# Patient Record
Sex: MALE | Race: WHITE | NOT HISPANIC OR LATINO | Employment: PART TIME | URBAN - NONMETROPOLITAN AREA
[De-identification: names, ages, dates, MRNs, and addresses within clinical notes are randomized per-mention and may not be internally consistent; named-entity substitution may affect disease eponyms.]

---

## 2022-06-30 ENCOUNTER — APPOINTMENT (OUTPATIENT)
Dept: FAMILY MEDICINE | Facility: OTHER | Age: 42
End: 2022-06-30
Attending: NURSE PRACTITIONER

## 2022-06-30 PROCEDURE — 99499 UNLISTED E&M SERVICE: CPT | Performed by: NURSE PRACTITIONER

## 2023-08-17 ENCOUNTER — OFFICE VISIT (OUTPATIENT)
Dept: FAMILY MEDICINE | Facility: OTHER | Age: 43
End: 2023-08-17
Attending: FAMILY MEDICINE
Payer: MEDICAID

## 2023-08-17 VITALS
HEIGHT: 66 IN | SYSTOLIC BLOOD PRESSURE: 120 MMHG | OXYGEN SATURATION: 94 % | RESPIRATION RATE: 20 BRPM | DIASTOLIC BLOOD PRESSURE: 70 MMHG | WEIGHT: 146.4 LBS | BODY MASS INDEX: 23.53 KG/M2 | HEART RATE: 69 BPM | TEMPERATURE: 98.2 F

## 2023-08-17 DIAGNOSIS — F43.10 PTSD (POST-TRAUMATIC STRESS DISORDER): ICD-10-CM

## 2023-08-17 DIAGNOSIS — G80.8 OTHER CEREBRAL PALSY (H): ICD-10-CM

## 2023-08-17 DIAGNOSIS — F15.11 METHAMPHETAMINE ABUSE IN REMISSION (H): Primary | ICD-10-CM

## 2023-08-17 DIAGNOSIS — S68.119S: ICD-10-CM

## 2023-08-17 DIAGNOSIS — Z02.89 HEALTH EXAMINATION OF DEFINED SUBPOPULATION: ICD-10-CM

## 2023-08-17 DIAGNOSIS — Z79.899 MEDICAL CANNABIS USE: ICD-10-CM

## 2023-08-17 PROBLEM — S68.119A FINGER AMPUTATION, NO COMPLICATION: Status: ACTIVE | Noted: 2023-08-17

## 2023-08-17 LAB
ALBUMIN SERPL BCG-MCNC: 4.7 G/DL (ref 3.5–5.2)
ALP SERPL-CCNC: 90 U/L (ref 40–129)
ALT SERPL W P-5'-P-CCNC: 15 U/L (ref 0–70)
ANION GAP SERPL CALCULATED.3IONS-SCNC: 6 MMOL/L (ref 7–15)
AST SERPL W P-5'-P-CCNC: 20 U/L (ref 0–45)
BASOPHILS # BLD AUTO: 0.1 10E3/UL (ref 0–0.2)
BASOPHILS NFR BLD AUTO: 1 %
BILIRUB SERPL-MCNC: 0.5 MG/DL
BUN SERPL-MCNC: 14.9 MG/DL (ref 6–20)
CALCIUM SERPL-MCNC: 9.6 MG/DL (ref 8.6–10)
CHLORIDE SERPL-SCNC: 103 MMOL/L (ref 98–107)
CHOLEST SERPL-MCNC: 162 MG/DL
CREAT SERPL-MCNC: 0.9 MG/DL (ref 0.67–1.17)
DEPRECATED HCO3 PLAS-SCNC: 30 MMOL/L (ref 22–29)
EOSINOPHIL # BLD AUTO: 0.1 10E3/UL (ref 0–0.7)
EOSINOPHIL NFR BLD AUTO: 1 %
ERYTHROCYTE [DISTWIDTH] IN BLOOD BY AUTOMATED COUNT: 12.2 % (ref 10–15)
GFR SERPL CREATININE-BSD FRML MDRD: >90 ML/MIN/1.73M2
GLUCOSE SERPL-MCNC: 97 MG/DL (ref 70–99)
HCT VFR BLD AUTO: 44.2 % (ref 40–53)
HDLC SERPL-MCNC: 37 MG/DL
HGB BLD-MCNC: 15.6 G/DL (ref 13.3–17.7)
IMM GRANULOCYTES # BLD: 0 10E3/UL
IMM GRANULOCYTES NFR BLD: 0 %
LDLC SERPL CALC-MCNC: 85 MG/DL
LYMPHOCYTES # BLD AUTO: 2.5 10E3/UL (ref 0.8–5.3)
LYMPHOCYTES NFR BLD AUTO: 29 %
MCH RBC QN AUTO: 30.7 PG (ref 26.5–33)
MCHC RBC AUTO-ENTMCNC: 35.3 G/DL (ref 31.5–36.5)
MCV RBC AUTO: 87 FL (ref 78–100)
MONOCYTES # BLD AUTO: 0.8 10E3/UL (ref 0–1.3)
MONOCYTES NFR BLD AUTO: 9 %
NEUTROPHILS # BLD AUTO: 5.1 10E3/UL (ref 1.6–8.3)
NEUTROPHILS NFR BLD AUTO: 60 %
NONHDLC SERPL-MCNC: 125 MG/DL
NRBC # BLD AUTO: 0 10E3/UL
NRBC BLD AUTO-RTO: 0 /100
PLATELET # BLD AUTO: 208 10E3/UL (ref 150–450)
POTASSIUM SERPL-SCNC: 4.1 MMOL/L (ref 3.4–5.3)
PROT SERPL-MCNC: 7.2 G/DL (ref 6.4–8.3)
RBC # BLD AUTO: 5.08 10E6/UL (ref 4.4–5.9)
SODIUM SERPL-SCNC: 139 MMOL/L (ref 136–145)
TRIGL SERPL-MCNC: 198 MG/DL
WBC # BLD AUTO: 8.5 10E3/UL (ref 4–11)

## 2023-08-17 PROCEDURE — 80061 LIPID PANEL: CPT | Mod: ZL | Performed by: FAMILY MEDICINE

## 2023-08-17 PROCEDURE — 36415 COLL VENOUS BLD VENIPUNCTURE: CPT | Mod: ZL | Performed by: FAMILY MEDICINE

## 2023-08-17 PROCEDURE — 99386 PREV VISIT NEW AGE 40-64: CPT | Performed by: FAMILY MEDICINE

## 2023-08-17 PROCEDURE — 85025 COMPLETE CBC W/AUTO DIFF WBC: CPT | Mod: ZL | Performed by: FAMILY MEDICINE

## 2023-08-17 PROCEDURE — 80053 COMPREHEN METABOLIC PANEL: CPT | Mod: ZL | Performed by: FAMILY MEDICINE

## 2023-08-17 ASSESSMENT — ENCOUNTER SYMPTOMS
DIARRHEA: 0
ARTHRALGIAS: 1
SORE THROAT: 0
EYE PAIN: 0
SHORTNESS OF BREATH: 0
WEAKNESS: 1
NERVOUS/ANXIOUS: 1
HEARTBURN: 0
COUGH: 0
CHILLS: 0
HEMATOCHEZIA: 0
PALPITATIONS: 1
JOINT SWELLING: 0
PARESTHESIAS: 0
DIZZINESS: 0
ABDOMINAL PAIN: 0
NAUSEA: 1
HEMATURIA: 0
FEVER: 0
FREQUENCY: 0
CONSTIPATION: 0
HEADACHES: 1
DYSURIA: 0

## 2023-08-17 ASSESSMENT — PAIN SCALES - GENERAL: PAINLEVEL: MILD PAIN (3)

## 2023-08-17 NOTE — LETTER
August 21, 2023      Omar Hay  [address]  [place] [address]        Dear ,    We are writing to inform you of your test results.    Your test results fall within the expected range(s) or remain unchanged from previous results.  Please continue with current treatment plan.    Resulted Orders   Lipid Panel   Result Value Ref Range    Cholesterol 162 <200 mg/dL    Triglycerides 198 (H) <150 mg/dL    Direct Measure HDL 37 (L) >=40 mg/dL    LDL Cholesterol Calculated 85 <=100 mg/dL    Non HDL Cholesterol 125 <130 mg/dL    Narrative    Cholesterol  Desirable:  <200 mg/dL    Triglycerides  Normal:  Less than 150 mg/dL  Borderline High:  150-199 mg/dL  High:  200-499 mg/dL  Very High:  Greater than or equal to 500 mg/dL    Direct Measure HDL  Female:  Greater than or equal to 50 mg/dL   Male:  Greater than or equal to 40 mg/dL    LDL Cholesterol  Desirable:  <100mg/dL  Above Desirable:  100-129 mg/dL   Borderline High:  130-159 mg/dL   High:  160-189 mg/dL   Very High:  >= 190 mg/dL    Non HDL Cholesterol  Desirable:  130 mg/dL  Above Desirable:  130-159 mg/dL  Borderline High:  160-189 mg/dL  High:  190-219 mg/dL  Very High:  Greater than or equal to 220 mg/dL   Comprehensive Metabolic Panel   Result Value Ref Range    Sodium 139 136 - 145 mmol/L    Potassium 4.1 3.4 - 5.3 mmol/L    Chloride 103 98 - 107 mmol/L    Carbon Dioxide (CO2) 30 (H) 22 - 29 mmol/L    Anion Gap 6 (L) 7 - 15 mmol/L    Urea Nitrogen 14.9 6.0 - 20.0 mg/dL    Creatinine 0.90 0.67 - 1.17 mg/dL    Calcium 9.6 8.6 - 10.0 mg/dL    Glucose 97 70 - 99 mg/dL    Alkaline Phosphatase 90 40 - 129 U/L    AST 20 0 - 45 U/L      Comment:      Reference intervals for this test were updated on 6/12/2023 to more accurately reflect our healthy population. There may be differences in the flagging of prior results with similar values performed with this method. Interpretation of those prior results can be made in the context of the updated reference  intervals.    ALT 15 0 - 70 U/L      Comment:      Reference intervals for this test were updated on 6/12/2023 to more accurately reflect our healthy population. There may be differences in the flagging of prior results with similar values performed with this method. Interpretation of those prior results can be made in the context of the updated reference intervals.      Protein Total 7.2 6.4 - 8.3 g/dL    Albumin 4.7 3.5 - 5.2 g/dL    Bilirubin Total 0.5 <=1.2 mg/dL    GFR Estimate >90 >60 mL/min/1.73m2   CBC with platelets and differential   Result Value Ref Range    WBC Count 8.5 4.0 - 11.0 10e3/uL    RBC Count 5.08 4.40 - 5.90 10e6/uL    Hemoglobin 15.6 13.3 - 17.7 g/dL    Hematocrit 44.2 40.0 - 53.0 %    MCV 87 78 - 100 fL    MCH 30.7 26.5 - 33.0 pg    MCHC 35.3 31.5 - 36.5 g/dL    RDW 12.2 10.0 - 15.0 %    Platelet Count 208 150 - 450 10e3/uL    % Neutrophils 60 %    % Lymphocytes 29 %    % Monocytes 9 %    % Eosinophils 1 %    % Basophils 1 %    % Immature Granulocytes 0 %    NRBCs per 100 WBC 0 <1 /100    Absolute Neutrophils 5.1 1.6 - 8.3 10e3/uL    Absolute Lymphocytes 2.5 0.8 - 5.3 10e3/uL    Absolute Monocytes 0.8 0.0 - 1.3 10e3/uL    Absolute Eosinophils 0.1 0.0 - 0.7 10e3/uL    Absolute Basophils 0.1 0.0 - 0.2 10e3/uL    Absolute Immature Granulocytes 0.0 <=0.4 10e3/uL    Absolute NRBCs 0.0 10e3/uL       If you have any questions or concerns, please call the clinic at the number listed above.       Sincerely,      Carlos Ozuna MD

## 2023-08-17 NOTE — NURSING NOTE
Patient here for physical and to establish care, last eye exam longer than 5 years and last dental exam 2022. Medication Reconciliation: complete.    Vivian Last LPN  8/17/2023 10:32 AM

## 2023-08-21 ASSESSMENT — ENCOUNTER SYMPTOMS
HEMATOCHEZIA: 0
EYE PAIN: 0
DIARRHEA: 0
SHORTNESS OF BREATH: 0
DIZZINESS: 0
FREQUENCY: 0
DYSURIA: 0
NAUSEA: 1
PARESTHESIAS: 0
HEARTBURN: 0
SORE THROAT: 0
CONSTIPATION: 0
COUGH: 0
JOINT SWELLING: 0
ABDOMINAL PAIN: 0
HEMATURIA: 0
PALPITATIONS: 1
HEADACHES: 1
ARTHRALGIAS: 1
NERVOUS/ANXIOUS: 1
CHILLS: 0
FEVER: 0
WEAKNESS: 1

## 2023-08-21 NOTE — PROGRESS NOTES
SUBJECTIVE:   Omar Hay is a 42 year old male who presents to clinic today for the following health issues: Physical    Patient is from [place] and arrives here for physical.  Establish care.  Patient does have a history of meth abuse.  Is been sober for 20 months.  He is currently going to [place].    Healthy Habits:     Getting at least 3 servings of Calcium per day:  NO    Bi-annual eye exam:  NO    Dental care twice a year:  NO    Sleep apnea or symptoms of sleep apnea:  Daytime drowsiness    Diet:  Regular (no restrictions)    Frequency of exercise:  2-3 days/week    Duration of exercise:  Less than 15 minutes    Taking medications regularly:  Yes    Medication side effects:  Not applicable    Additional concerns today:  Yes        Patient Active Problem List    Diagnosis Date Noted    Methamphetamine abuse in remission (H) 08/17/2023     Priority: Medium    Other cerebral palsy (H) 08/17/2023     Priority: Medium    Finger amputation, no complication 08/17/2023     Priority: Medium    PTSD (post-traumatic stress disorder) 08/17/2023     Priority: Medium    Medical cannabis use 08/17/2023     Priority: Medium       Review of Systems   Constitutional:  Negative for chills and fever.   HENT:  Negative for congestion, ear pain, hearing loss and sore throat.    Eyes:  Negative for pain and visual disturbance.   Respiratory:  Negative for cough and shortness of breath.    Cardiovascular:  Positive for palpitations. Negative for chest pain and peripheral edema.   Gastrointestinal:  Positive for nausea. Negative for abdominal pain, constipation, diarrhea, heartburn and hematochezia.   Genitourinary:  Negative for dysuria, frequency, genital sores, hematuria, impotence, penile discharge and urgency.   Musculoskeletal:  Positive for arthralgias. Negative for joint swelling.   Skin:  Negative for rash.   Neurological:  Positive for weakness and headaches. Negative for dizziness and paresthesias.  "  Psychiatric/Behavioral:  Positive for mood changes. The patient is nervous/anxious.         OBJECTIVE:     /70   Pulse 69   Temp 98.2  F (36.8  C)   Resp 20   Ht 1.664 m (5' 5.5\")   Wt 66.4 kg (146 lb 6.4 oz)   SpO2 94%   BMI 23.99 kg/m    Body mass index is 23.99 kg/m .  Physical Exam  Constitutional:       Appearance: Normal appearance.   HENT:      Head: Normocephalic.      Mouth/Throat:      Mouth: Mucous membranes are moist.   Cardiovascular:      Rate and Rhythm: Normal rate and regular rhythm.   Pulmonary:      Effort: Pulmonary effort is normal.      Breath sounds: Normal breath sounds.   Neurological:      Mental Status: He is alert.   Psychiatric:         Mood and Affect: Mood normal.         Thought Content: Thought content normal.         Diagnostic Test Results:  No results found for this or any previous visit (from the past 24 hour(s)).  Results for orders placed or performed in visit on 08/17/23   Lipid Panel     Status: Abnormal   Result Value Ref Range    Cholesterol 162 <200 mg/dL    Triglycerides 198 (H) <150 mg/dL    Direct Measure HDL 37 (L) >=40 mg/dL    LDL Cholesterol Calculated 85 <=100 mg/dL    Non HDL Cholesterol 125 <130 mg/dL    Narrative    Cholesterol  Desirable:  <200 mg/dL    Triglycerides  Normal:  Less than 150 mg/dL  Borderline High:  150-199 mg/dL  High:  200-499 mg/dL  Very High:  Greater than or equal to 500 mg/dL    Direct Measure HDL  Female:  Greater than or equal to 50 mg/dL   Male:  Greater than or equal to 40 mg/dL    LDL Cholesterol  Desirable:  <100mg/dL  Above Desirable:  100-129 mg/dL   Borderline High:  130-159 mg/dL   High:  160-189 mg/dL   Very High:  >= 190 mg/dL    Non HDL Cholesterol  Desirable:  130 mg/dL  Above Desirable:  130-159 mg/dL  Borderline High:  160-189 mg/dL  High:  190-219 mg/dL  Very High:  Greater than or equal to 220 mg/dL   Comprehensive Metabolic Panel     Status: Abnormal   Result Value Ref Range    Sodium 139 136 - 145 mmol/L "    Potassium 4.1 3.4 - 5.3 mmol/L    Chloride 103 98 - 107 mmol/L    Carbon Dioxide (CO2) 30 (H) 22 - 29 mmol/L    Anion Gap 6 (L) 7 - 15 mmol/L    Urea Nitrogen 14.9 6.0 - 20.0 mg/dL    Creatinine 0.90 0.67 - 1.17 mg/dL    Calcium 9.6 8.6 - 10.0 mg/dL    Glucose 97 70 - 99 mg/dL    Alkaline Phosphatase 90 40 - 129 U/L    AST 20 0 - 45 U/L    ALT 15 0 - 70 U/L    Protein Total 7.2 6.4 - 8.3 g/dL    Albumin 4.7 3.5 - 5.2 g/dL    Bilirubin Total 0.5 <=1.2 mg/dL    GFR Estimate >90 >60 mL/min/1.73m2   CBC with platelets and differential     Status: None   Result Value Ref Range    WBC Count 8.5 4.0 - 11.0 10e3/uL    RBC Count 5.08 4.40 - 5.90 10e6/uL    Hemoglobin 15.6 13.3 - 17.7 g/dL    Hematocrit 44.2 40.0 - 53.0 %    MCV 87 78 - 100 fL    MCH 30.7 26.5 - 33.0 pg    MCHC 35.3 31.5 - 36.5 g/dL    RDW 12.2 10.0 - 15.0 %    Platelet Count 208 150 - 450 10e3/uL    % Neutrophils 60 %    % Lymphocytes 29 %    % Monocytes 9 %    % Eosinophils 1 %    % Basophils 1 %    % Immature Granulocytes 0 %    NRBCs per 100 WBC 0 <1 /100    Absolute Neutrophils 5.1 1.6 - 8.3 10e3/uL    Absolute Lymphocytes 2.5 0.8 - 5.3 10e3/uL    Absolute Monocytes 0.8 0.0 - 1.3 10e3/uL    Absolute Eosinophils 0.1 0.0 - 0.7 10e3/uL    Absolute Basophils 0.1 0.0 - 0.2 10e3/uL    Absolute Immature Granulocytes 0.0 <=0.4 10e3/uL    Absolute NRBCs 0.0 10e3/uL   CBC and Differential     Status: None    Narrative    The following orders were created for panel order CBC and Differential.  Procedure                               Abnormality         Status                     ---------                               -----------         ------                     CBC with platelets and d...[[id number]]                      Final result                 Please view results for these tests on the individual orders.       ASSESSMENT/PLAN:         (F15.11) Methamphetamine abuse in remission (H)  (primary encounter diagnosis) patient denies IV drug use or sharing  needles.  Declines hepatitis C HIV  Comment:   Plan: Labs are satisfactory    (Z02.89) Health examination of defined subpopulation  Comment:   Plan: Lipid Panel, Comprehensive Metabolic Panel, CBC        and Differential            (G80.8) Other cerebral palsy (H)  Comment:   Plan:     (S68.119S) Amputation of finger without complication, sequela (H)  Comment:   Plan:     (F43.10) PTSD (post-traumatic stress disorder)  Comment:   Plan:     (Z79.899) Medical cannabis use  Comment:   Plan:       Carlos Ozuna MD  [facility] AND [facility]

## 2023-10-03 ENCOUNTER — OFFICE VISIT (OUTPATIENT)
Dept: FAMILY MEDICINE | Facility: OTHER | Age: 43
End: 2023-10-03
Attending: PHYSICIAN ASSISTANT
Payer: MEDICARE

## 2023-10-03 VITALS
TEMPERATURE: 97.8 F | DIASTOLIC BLOOD PRESSURE: 72 MMHG | WEIGHT: 147.6 LBS | OXYGEN SATURATION: 96 % | HEART RATE: 60 BPM | BODY MASS INDEX: 23.72 KG/M2 | SYSTOLIC BLOOD PRESSURE: 128 MMHG | RESPIRATION RATE: 14 BRPM | HEIGHT: 66 IN

## 2023-10-03 DIAGNOSIS — K64.4 EXTERNAL HEMORRHOIDS: Primary | ICD-10-CM

## 2023-10-03 PROCEDURE — 99213 OFFICE O/P EST LOW 20 MIN: CPT | Performed by: PHYSICIAN ASSISTANT

## 2023-10-03 PROCEDURE — G0463 HOSPITAL OUTPT CLINIC VISIT: HCPCS

## 2023-10-03 RX ORDER — DIBUCAINE 1 G/100G
OINTMENT TOPICAL 3 TIMES DAILY PRN
Qty: 60 G | Refills: 1 | Status: SHIPPED | OUTPATIENT
Start: 2023-10-03

## 2023-10-03 ASSESSMENT — PATIENT HEALTH QUESTIONNAIRE - PHQ9
10. IF YOU CHECKED OFF ANY PROBLEMS, HOW DIFFICULT HAVE THESE PROBLEMS MADE IT FOR YOU TO DO YOUR WORK, TAKE CARE OF THINGS AT HOME, OR GET ALONG WITH OTHER PEOPLE: SOMEWHAT DIFFICULT
SUM OF ALL RESPONSES TO PHQ QUESTIONS 1-9: 13
SUM OF ALL RESPONSES TO PHQ QUESTIONS 1-9: 13

## 2023-10-03 ASSESSMENT — PAIN SCALES - GENERAL: PAINLEVEL: EXTREME PAIN (8)

## 2023-10-03 NOTE — LETTER
October 3, 2023      Omar Galdamez Hay  [address]  [facility] [address]        To Whom It May Concern:    Omar Hay  was seen on 10/03/2023.  Please excuse his absence from work today.         Sincerely,        Janelle López PA-C

## 2023-10-03 NOTE — PROGRESS NOTES
Assessment & Plan     1. External hemorrhoids  Symptoms and exam consistent with non thrombosed external hemorrhoid. Prescription for topical numbing medication as below. Encourage good hydration, adequate fiber intake, prn management of constipation/harder stools. Follow up as needed.   - dibucaine 1 % OINT rectal ointment; Place rectally 3 times daily as needed for hemorrhoids  Dispense: 60 g; Refill: 1    No follow-ups on file.    Janelle López PA-C  [facility] AND [facility]    Ary Nelson is a 43 year old, presenting for the following health issues:  Rectal Problem      History of Present Illness       Reason for visit:  Anus pain  Symptom onset:  1-3 days ago    He eats 0-1 servings of fruits and vegetables daily.He consumes 6 sweetened beverage(s) daily.He exercises with enough effort to increase his heart rate 60 or more minutes per day.  He exercises with enough effort to increase his heart rate 6 days per week.   He is taking medications regularly.     Here for evaluation of rectal pain that began yesterday. Several episodes of loose stools after a fairly large more difficult stool yesterday morning. Having significant pain yesterday throughout today. Has not taken anything for symptomatic management. No abdominal pain, nausea, vomiting, blood in stool.     PAST MEDICAL HISTORY: No past medical history on file.    PAST SURGICAL HISTORY: No past surgical history on file.    FAMILY HISTORY: No family history on file.    SOCIAL HISTORY:   Social History     Tobacco Use    Smoking status: Former     Types: Cigarettes    Smokeless tobacco: Former   Substance Use Topics    Alcohol use: Not Currently        Allergies   Allergen Reactions    Penicillins Hives and Rash    Ketorolac Hives    Tramadol Hives and Nausea and Vomiting     rash     Current Outpatient Medications   Medication    dibucaine 1 % OINT rectal ointment    medical cannabis (Patient's own supply)     No current  "facility-administered medications for this visit.         Review of Systems   Per HPI        Objective    /72   Pulse 60   Temp 97.8  F (36.6  C)   Resp 14   Ht 1.664 m (5' 5.5\")   Wt 67 kg (147 lb 9.6 oz)   SpO2 96%   BMI 24.19 kg/m    Body mass index is 24.19 kg/m .  Physical Exam   General: Pleasant, in no apparent distress.  Rectal: Non thrombosed hemorrhoid in posterior region without bleeding. Patient deferred FRANCES.   Psych: Appropriate mood and affect.      "

## 2023-10-03 NOTE — NURSING NOTE
Patient presents to clinic with concerns about possible hemorrhoids. He states diarrhea started yesterday and it is painful to sit, walk  Nova Denny LPN ....................  10/3/2023   9:10 AM  EXT [id number]

## 2023-11-12 ENCOUNTER — APPOINTMENT (OUTPATIENT)
Dept: GENERAL RADIOLOGY | Facility: OTHER | Age: 43
End: 2023-11-12
Attending: PHYSICIAN ASSISTANT
Payer: MEDICARE

## 2023-11-12 ENCOUNTER — HOSPITAL ENCOUNTER (EMERGENCY)
Facility: OTHER | Age: 43
Discharge: HOME | End: 2023-11-12
Attending: PHYSICIAN ASSISTANT | Admitting: PHYSICIAN ASSISTANT
Payer: MEDICARE

## 2023-11-12 VITALS
WEIGHT: 151 LBS | OXYGEN SATURATION: 96 % | BODY MASS INDEX: 24.27 KG/M2 | DIASTOLIC BLOOD PRESSURE: 65 MMHG | SYSTOLIC BLOOD PRESSURE: 105 MMHG | RESPIRATION RATE: 16 BRPM | TEMPERATURE: 97.3 F | HEIGHT: 66 IN | HEART RATE: 48 BPM

## 2023-11-12 DIAGNOSIS — R19.7 NAUSEA VOMITING AND DIARRHEA: ICD-10-CM

## 2023-11-12 DIAGNOSIS — R11.2 NAUSEA VOMITING AND DIARRHEA: ICD-10-CM

## 2023-11-12 LAB
ALBUMIN SERPL BCG-MCNC: 4.4 G/DL (ref 3.5–5.2)
ALP SERPL-CCNC: 81 U/L (ref 40–129)
ALT SERPL W P-5'-P-CCNC: 12 U/L (ref 0–70)
ANION GAP SERPL CALCULATED.3IONS-SCNC: 10 MMOL/L (ref 7–15)
AST SERPL W P-5'-P-CCNC: 20 U/L (ref 0–45)
BASOPHILS # BLD AUTO: 0.1 10E3/UL (ref 0–0.2)
BASOPHILS NFR BLD AUTO: 1 %
BILIRUB SERPL-MCNC: 0.5 MG/DL
BUN SERPL-MCNC: 19.2 MG/DL (ref 6–20)
CALCIUM SERPL-MCNC: 9.1 MG/DL (ref 8.6–10)
CHLORIDE SERPL-SCNC: 104 MMOL/L (ref 98–107)
CREAT SERPL-MCNC: 0.85 MG/DL (ref 0.67–1.17)
DEPRECATED HCO3 PLAS-SCNC: 25 MMOL/L (ref 22–29)
EGFRCR SERPLBLD CKD-EPI 2021: >90 ML/MIN/1.73M2
EOSINOPHIL # BLD AUTO: 0.3 10E3/UL (ref 0–0.7)
EOSINOPHIL NFR BLD AUTO: 3 %
ERYTHROCYTE [DISTWIDTH] IN BLOOD BY AUTOMATED COUNT: 12.1 % (ref 10–15)
FLUAV RNA SPEC QL NAA+PROBE: NEGATIVE
FLUBV RNA RESP QL NAA+PROBE: NEGATIVE
GLUCOSE SERPL-MCNC: 101 MG/DL (ref 70–99)
HCT VFR BLD AUTO: 39.9 % (ref 40–53)
HGB BLD-MCNC: 14.1 G/DL (ref 13.3–17.7)
HOLD SPECIMEN: NORMAL
HOLD SPECIMEN: NORMAL
IMM GRANULOCYTES # BLD: 0 10E3/UL
IMM GRANULOCYTES NFR BLD: 0 %
LACTATE SERPL-SCNC: 1 MMOL/L (ref 0.7–2)
LIPASE SERPL-CCNC: 29 U/L (ref 13–60)
LYMPHOCYTES # BLD AUTO: 3.1 10E3/UL (ref 0.8–5.3)
LYMPHOCYTES NFR BLD AUTO: 33 %
MAGNESIUM SERPL-MCNC: 2.1 MG/DL (ref 1.7–2.3)
MCH RBC QN AUTO: 30.5 PG (ref 26.5–33)
MCHC RBC AUTO-ENTMCNC: 35.3 G/DL (ref 31.5–36.5)
MCV RBC AUTO: 86 FL (ref 78–100)
MONOCYTES # BLD AUTO: 1 10E3/UL (ref 0–1.3)
MONOCYTES NFR BLD AUTO: 11 %
NEUTROPHILS # BLD AUTO: 4.9 10E3/UL (ref 1.6–8.3)
NEUTROPHILS NFR BLD AUTO: 52 %
NRBC # BLD AUTO: 0 10E3/UL
NRBC BLD AUTO-RTO: 0 /100
PLATELET # BLD AUTO: 202 10E3/UL (ref 150–450)
POTASSIUM SERPL-SCNC: 3.9 MMOL/L (ref 3.4–5.3)
PROT SERPL-MCNC: 6.5 G/DL (ref 6.4–8.3)
RBC # BLD AUTO: 4.62 10E6/UL (ref 4.4–5.9)
RSV RNA SPEC NAA+PROBE: NEGATIVE
SARS-COV-2 RNA RESP QL NAA+PROBE: NEGATIVE
SODIUM SERPL-SCNC: 139 MMOL/L (ref 135–145)
TROPONIN T SERPL HS-MCNC: <6 NG/L
WBC # BLD AUTO: 9.3 10E3/UL (ref 4–11)

## 2023-11-12 PROCEDURE — 83690 ASSAY OF LIPASE: CPT | Performed by: PHYSICIAN ASSISTANT

## 2023-11-12 PROCEDURE — 83735 ASSAY OF MAGNESIUM: CPT | Performed by: PHYSICIAN ASSISTANT

## 2023-11-12 PROCEDURE — 96361 HYDRATE IV INFUSION ADD-ON: CPT | Performed by: PHYSICIAN ASSISTANT

## 2023-11-12 PROCEDURE — 96360 HYDRATION IV INFUSION INIT: CPT | Performed by: PHYSICIAN ASSISTANT

## 2023-11-12 PROCEDURE — 99284 EMERGENCY DEPT VISIT MOD MDM: CPT | Performed by: PHYSICIAN ASSISTANT

## 2023-11-12 PROCEDURE — 99285 EMERGENCY DEPT VISIT HI MDM: CPT | Mod: 25 | Performed by: PHYSICIAN ASSISTANT

## 2023-11-12 PROCEDURE — 93005 ELECTROCARDIOGRAM TRACING: CPT | Performed by: PHYSICIAN ASSISTANT

## 2023-11-12 PROCEDURE — 80053 COMPREHEN METABOLIC PANEL: CPT | Performed by: PHYSICIAN ASSISTANT

## 2023-11-12 PROCEDURE — 93005 ELECTROCARDIOGRAM TRACING: CPT | Mod: RTG

## 2023-11-12 PROCEDURE — C9803 HOPD COVID-19 SPEC COLLECT: HCPCS | Performed by: PHYSICIAN ASSISTANT

## 2023-11-12 PROCEDURE — 36415 COLL VENOUS BLD VENIPUNCTURE: CPT | Performed by: PHYSICIAN ASSISTANT

## 2023-11-12 PROCEDURE — 85025 COMPLETE CBC W/AUTO DIFF WBC: CPT | Performed by: PHYSICIAN ASSISTANT

## 2023-11-12 PROCEDURE — 83605 ASSAY OF LACTIC ACID: CPT | Performed by: PHYSICIAN ASSISTANT

## 2023-11-12 PROCEDURE — 258N000003 HC RX IP 258 OP 636: Performed by: PHYSICIAN ASSISTANT

## 2023-11-12 PROCEDURE — 93010 ELECTROCARDIOGRAM REPORT: CPT | Performed by: INTERNAL MEDICINE

## 2023-11-12 PROCEDURE — 71045 X-RAY EXAM CHEST 1 VIEW: CPT | Mod: TC

## 2023-11-12 PROCEDURE — 87637 SARSCOV2&INF A&B&RSV AMP PRB: CPT | Performed by: PHYSICIAN ASSISTANT

## 2023-11-12 PROCEDURE — 84484 ASSAY OF TROPONIN QUANT: CPT | Performed by: PHYSICIAN ASSISTANT

## 2023-11-12 RX ORDER — ONDANSETRON 4 MG/1
4 TABLET, ORALLY DISINTEGRATING ORAL EVERY 6 HOURS PRN
Qty: 10 TABLET | Refills: 0 | Status: SHIPPED | OUTPATIENT
Start: 2023-11-12 | End: 2024-03-19

## 2023-11-12 RX ORDER — ONDANSETRON 2 MG/ML
4 INJECTION INTRAMUSCULAR; INTRAVENOUS ONCE
Status: COMPLETED | OUTPATIENT
Start: 2023-11-12 | End: 2023-11-12

## 2023-11-12 RX ADMIN — SODIUM CHLORIDE 1000 ML: 9 INJECTION, SOLUTION INTRAVENOUS at 20:05

## 2023-11-12 ASSESSMENT — ACTIVITIES OF DAILY LIVING (ADL): ADLS_ACUITY_SCORE: 35

## 2023-11-13 LAB
ATRIAL RATE - MUSE: 46 BPM
DIASTOLIC BLOOD PRESSURE - MUSE: NORMAL MMHG
INTERPRETATION ECG - MUSE: NORMAL
P AXIS - MUSE: 71 DEGREES
PR INTERVAL - MUSE: 118 MS
QRS DURATION - MUSE: 94 MS
QT - MUSE: 454 MS
QTC - MUSE: 397 MS
R AXIS - MUSE: 72 DEGREES
SYSTOLIC BLOOD PRESSURE - MUSE: NORMAL MMHG
T AXIS - MUSE: 55 DEGREES
VENTRICULAR RATE- MUSE: 46 BPM

## 2023-11-13 NOTE — ED TRIAGE NOTES
Pt arrives to ER via private vehicle with c/o N/V/Diarrhea, and dizziness that started yesterday afternoon. Pt denies fevers or feeling SOB.

## 2023-11-13 NOTE — ED PROVIDER NOTES
"      EMERGENCY DEPARTMENT ENCOUNTER      NAME: Omar Hay  AGE: 43 year old male  YOB: 1980  MRN: [id number]  EVALUATION DATE & TIME: 11/12/2023  7:30 PM    PCP: Carlos Ozuna    ED PROVIDER: Lowell Pantoja PA-C       CHIEF COMPLAINT:  Chief Complaint   Patient presents with    Dizziness    Nausea, Vomiting, & Diarrhea       ED COURSE, MEDICAL DECISION MAKING, FINAL IMPRESSION AND PLAN:     Assessment / Plan:  1. Nausea vomiting and diarrhea        The patient was interviewed and examined.  HPI and physical exam as below.  Differential diagnosis and MDM Key Documentation Elements as below.  Vitals, triage note, and nursing notes were reviewed.  /65   Pulse (!) 48   Temp 97.3  F (36.3  C) (Tympanic)   Resp 16   Ht 1.676 m (5' 6\")   Wt 68.5 kg (151 lb)   SpO2 96%   BMI 24.37 kg/m      Differential includes but is not limited to gastroenteritis, colitis, diverticulitis, cardiac arrhythmia, UTI, appendicitis, food poisoning    Patient was afebrile with otherwise normal vitals.  Patient was in no acute distress.  Examination today was unremarkable with no acute findings.  No abdominal tenderness.  Heart was regular.  Lungs were clear.  EKG showed sinus bradycardia without ST segment deviation to suggest ACS/MI.  Screening troponin nonelevated.  RSV, influenza, and COVID were negative.  No leukocytosis.  No anemia.  Normal renal, hepatic, and pancreatic functions.  Normal lactic acid.  No electrolyte abnormalities.  Chest x-ray clear.  No abdominal tenderness to warrant CT imaging of the abdomen pelvis.  Patient was given IV fluids and IV Zofran with patient feeling significantly better.  Patient states he is ready to go home.  Discussed close follow-up precautions.  Follow-up with his primary care doctor.  Return to the ER for any worsening of symptoms    Medications given during today's ER visit:  Medications   sodium chloride 0.9% BOLUS 1,000 mL (0 mLs Intravenous Stopped " 11/12/23 2136)   ondansetron (ZOFRAN) injection 4 mg (4 mg Intravenous Not Given 11/12/23 2010)       New prescriptions started at today's ER visit  Discharge Medication List as of 11/12/2023  9:43 PM        START taking these medications    Details   ondansetron (ZOFRAN ODT) 4 MG ODT tab Take 1 tablet (4 mg) by mouth every 6 hours as needed for nausea, Disp-10 tablet, R-0, [facility]               Pertinent Labs & Imaging studies reviewed. (See chart for details)  Results for orders placed or performed during the hospital encounter of 11/12/23   XR Chest Port 1 View    Impression    IMPRESSION:   No acute findings.     THIS DOCUMENT HAS BEEN ELECTRONICALLY SIGNED BY JUDY VIZCAINO MD   Comprehensive metabolic panel   Result Value Ref Range    Sodium 139 135 - 145 mmol/L    Potassium 3.9 3.4 - 5.3 mmol/L    Carbon Dioxide (CO2) 25 22 - 29 mmol/L    Anion Gap 10 7 - 15 mmol/L    Urea Nitrogen 19.2 6.0 - 20.0 mg/dL    Creatinine 0.85 0.67 - 1.17 mg/dL    GFR Estimate >90 >60 mL/min/1.73m2    Calcium 9.1 8.6 - 10.0 mg/dL    Chloride 104 98 - 107 mmol/L    Glucose 101 (H) 70 - 99 mg/dL    Alkaline Phosphatase 81 40 - 129 U/L    AST 20 0 - 45 U/L    ALT 12 0 - 70 U/L    Protein Total 6.5 6.4 - 8.3 g/dL    Albumin 4.4 3.5 - 5.2 g/dL    Bilirubin Total 0.5 <=1.2 mg/dL   Result Value Ref Range    Lipase 29 13 - 60 U/L   Lactic acid whole blood   Result Value Ref Range    Lactic Acid 1.0 0.7 - 2.0 mmol/L   Result Value Ref Range    Magnesium 2.1 1.7 - 2.3 mg/dL   Result Value Ref Range    Troponin T, High Sensitivity <6 <=22 ng/L   CBC with platelets and differential   Result Value Ref Range    WBC Count 9.3 4.0 - 11.0 10e3/uL    RBC Count 4.62 4.40 - 5.90 10e6/uL    Hemoglobin 14.1 13.3 - 17.7 g/dL    Hematocrit 39.9 (L) 40.0 - 53.0 %    MCV 86 78 - 100 fL    MCH 30.5 26.5 - 33.0 pg    MCHC 35.3 31.5 - 36.5 g/dL    RDW 12.1 10.0 - 15.0 %    Platelet Count 202 150 - 450 10e3/uL    % Neutrophils 52 %    % Lymphocytes 33 %    %  "Monocytes 11 %    % Eosinophils 3 %    % Basophils 1 %    % Immature Granulocytes 0 %    NRBCs per 100 WBC 0 <1 /100    Absolute Neutrophils 4.9 1.6 - 8.3 10e3/uL    Absolute Lymphocytes 3.1 0.8 - 5.3 10e3/uL    Absolute Monocytes 1.0 0.0 - 1.3 10e3/uL    Absolute Eosinophils 0.3 0.0 - 0.7 10e3/uL    Absolute Basophils 0.1 0.0 - 0.2 10e3/uL    Absolute Immature Granulocytes 0.0 <=0.4 10e3/uL    Absolute NRBCs 0.0 10e3/uL   Extra Blue Top Tube   Result Value Ref Range    Hold Specimen JIC    Extra Red Top Tube   Result Value Ref Range    Hold Specimen JIC    Asymptomatic Influenza A/B, RSV, & SARS-CoV2 PCR (COVID-19) Nose    Specimen: Nose; Swab   Result Value Ref Range    Influenza A PCR Negative Negative    Influenza B PCR Negative Negative    RSV PCR Negative Negative    SARS CoV2 PCR Negative Negative   EKG 12-lead, tracing only   Result Value Ref Range    Systolic Blood Pressure  mmHg    Diastolic Blood Pressure  mmHg    Ventricular Rate 46 BPM    Atrial Rate 46 BPM    CA Interval 118 ms    QRS Duration 94 ms     ms    QTc 397 ms    P Axis 71 degrees    R AXIS 72 degrees    T Axis 55 degrees    Interpretation ECG       Sinus bradycardia  Otherwise normal ECG  No previous ECGs available  Confirmed by MD SUSHMA, [facility] ([id number]) on 11/13/2023 4:43:22 PM       No results found for: \"ABORH\"      Reassessments, Medications, Interventions, & Response to Treatments:  Improved as above    Consultations:  None    Decision Rules, Medical Calculators, and Risk Stratification Tools:  None    MDM Key Documentation Elements for Patient's Evaluation:  Differential diagnosis to include high risk considerations: As above  Escalation to admission/observation considered: Admission/observation considered, but patient does not meet admission criteria  Discussions and management with other clinicians:    3a. Independent interpretation of testing performed by another health professional:  -No  3b. Discussion of management or test " interpretation with another health professional: -No  Independent interpretation of tests:  Ordering and/or review of 3+ test(s)  Discussion of test interpretations with radiology:  No  History obtained from source other than patient or assessment requiring an independent historian:  No  Review of non-ED/external records:  review of 3+ records  Diagnostic tests considered but not ultimately performed/deferred:  -CT abdomen pelvis but patient denying any pain  Prescription medications considered but not prescribed:  -Antibiotics  Chronic conditions affecting care:  -None  Care affected by social determinants of health:  -None    The patient's management involved:   - Laboratory studies  - Imaging studies  - Parenteral controlled substance  - Prescription drug management    A shared decision making model was used. Time was taken to answer all questions.  Patient and/or associated parties understood and were agreeable to treatment plan.  Plan and all results were discussed. Warning signs and close return precautions to return to the ED given. Copy of results given. Discharged in stable condition. Discharged with discharge instructions outlining plan for further care and follow up.      PPE worn during patient evaluation:  Mask: Yes  Eye Protection: No  Gown: No  Hair cover: No  Face Shield: No  Patient wearing a mask: No    =================================================================    HPI  Omar Hay is a pleasant 43 year old male who presents to the ER today for complaints of nausea, vomiting, diarrhea.  Patient states his symptoms began yesterday afternoon after eating a country kitchen.  After eating he noticed symptoms an hour later.  Patient also feeling lightheaded from being possibly dehydrated.  No abdominal pain.  Patient is concerned for possible food poisoning.      REVIEW OF SYSTEMS   Review of Systems  As above, otherwise ROS is unremarkable.      PAST MEDICAL HISTORY:  History reviewed.  No pertinent past medical history.    PAST SURGICAL HISTORY:  History reviewed. No pertinent surgical history.    CURRENT MEDICATIONS:    Current Outpatient Medications   Medication Instructions    dibucaine 1 % OINT rectal ointment Rectal, 3 TIMES DAILY PRN    medical cannabis (Patient's own supply) SEE ADMIN INSTRUCTIONS, (The purpose of this order is to document that the patient reports taking medical cannabis.  This is not a prescription, and is not used to certify that the patient has a qualifying medical condition.)    ondansetron (ZOFRAN ODT) 4 mg, Oral, EVERY 6 HOURS PRN       ALLERGIES:  Allergies   Allergen Reactions    Penicillins Hives and Rash    Ketorolac Hives    Tramadol Hives and Nausea and Vomiting     rash       FAMILY HISTORY:  History reviewed. No pertinent family history.    SOCIAL HISTORY:   Social History     Socioeconomic History    Marital status: Single     Spouse name: None    Number of children: None    Years of education: None    Highest education level: None   Tobacco Use    Smoking status: Every Day     Packs/day: .25     Types: Cigarettes    Smokeless tobacco: Former   Vaping Use    Vaping Use: Never used   Substance and Sexual Activity    Alcohol use: Not Currently     Comment: quite drinking 15 years ago    Drug use: Not Currently     Types: Methamphetamines, Marijuana     Comment: former meth user 3 years clean    Sexual activity: Yes     Partners: Female     Social Determinants of Health     Financial Resource Strain: Low Risk  (10/3/2023)    Financial Resource Strain     Within the past 12 months, have you or your family members you live with been unable to get utilities (heat, electricity) when it was really needed?: No   Food Insecurity: Unknown (10/3/2023)    Food Insecurity     Within the past 12 months, did you worry that your food would run out before you got money to buy more?: Patient refused     Within the past 12 months, did the food you bought just not last and you  "didn t have money to get more?: Patient refused   Transportation Needs: Low Risk  (10/3/2023)    Transportation Needs     Within the past 12 months, has lack of transportation kept you from medical appointments, getting your medicines, non-medical meetings or appointments, work, or from getting things that you need?: No   Interpersonal Safety: Low Risk  (10/3/2023)    Interpersonal Safety     Do you feel physically and emotionally safe where you currently live?: Yes     Within the past 12 months, have you been hit, slapped, kicked or otherwise physically hurt by someone?: No     Within the past 12 months, have you been humiliated or emotionally abused in other ways by your partner or ex-partner?: No   Housing Stability: High Risk (10/3/2023)    Housing Stability     Do you have housing? : Yes     Are you worried about losing your housing?: Yes       PHYSICAL EXAM    VITAL SIGNS: /65   Pulse (!) 48   Temp 97.3  F (36.3  C) (Tympanic)   Resp 16   Ht 1.676 m (5' 6\")   Wt 68.5 kg (151 lb)   SpO2 96%   BMI 24.37 kg/m      No data found.      Physical Exam  Vitals and nursing note reviewed.   Constitutional:       Appearance: Normal appearance. He is not ill-appearing or diaphoretic.   HENT:      Right Ear: Tympanic membrane, ear canal and external ear normal.      Left Ear: Tympanic membrane, ear canal and external ear normal.      Nose: Nose normal.      Mouth/Throat:      Mouth: Mucous membranes are moist.      Pharynx: Oropharynx is clear.   Eyes:      Conjunctiva/sclera: Conjunctivae normal.   Cardiovascular:      Rate and Rhythm: Normal rate and regular rhythm.      Pulses: Normal pulses.      Heart sounds: Normal heart sounds. No murmur heard.     No friction rub. No gallop.   Pulmonary:      Effort: Pulmonary effort is normal.      Breath sounds: Normal breath sounds.   Abdominal:      General: Abdomen is flat. Bowel sounds are normal.      Palpations: Abdomen is soft.      Tenderness: There is no " abdominal tenderness. There is no right CVA tenderness, left CVA tenderness, guarding or rebound.   Musculoskeletal:         General: Normal range of motion.      Cervical back: Normal range of motion and neck supple.   Skin:     General: Skin is warm and dry.      Capillary Refill: Capillary refill takes less than 2 seconds.      Coloration: Skin is not jaundiced.   Neurological:      General: No focal deficit present.      Mental Status: He is alert.   Psychiatric:         Mood and Affect: Mood normal.              LABS & RADIOLOGY:  All pertinent labs reviewed and interpreted. Reviewed all pertinent imaging. Please see official radiology report.  Results for orders placed or performed during the hospital encounter of 11/12/23   XR Chest Port 1 View    Impression    IMPRESSION:   No acute findings.     THIS DOCUMENT HAS BEEN ELECTRONICALLY SIGNED BY JUDY VIZCAINO MD   Comprehensive metabolic panel   Result Value Ref Range    Sodium 139 135 - 145 mmol/L    Potassium 3.9 3.4 - 5.3 mmol/L    Carbon Dioxide (CO2) 25 22 - 29 mmol/L    Anion Gap 10 7 - 15 mmol/L    Urea Nitrogen 19.2 6.0 - 20.0 mg/dL    Creatinine 0.85 0.67 - 1.17 mg/dL    GFR Estimate >90 >60 mL/min/1.73m2    Calcium 9.1 8.6 - 10.0 mg/dL    Chloride 104 98 - 107 mmol/L    Glucose 101 (H) 70 - 99 mg/dL    Alkaline Phosphatase 81 40 - 129 U/L    AST 20 0 - 45 U/L    ALT 12 0 - 70 U/L    Protein Total 6.5 6.4 - 8.3 g/dL    Albumin 4.4 3.5 - 5.2 g/dL    Bilirubin Total 0.5 <=1.2 mg/dL   Result Value Ref Range    Lipase 29 13 - 60 U/L   Lactic acid whole blood   Result Value Ref Range    Lactic Acid 1.0 0.7 - 2.0 mmol/L   Result Value Ref Range    Magnesium 2.1 1.7 - 2.3 mg/dL   Result Value Ref Range    Troponin T, High Sensitivity <6 <=22 ng/L   CBC with platelets and differential   Result Value Ref Range    WBC Count 9.3 4.0 - 11.0 10e3/uL    RBC Count 4.62 4.40 - 5.90 10e6/uL    Hemoglobin 14.1 13.3 - 17.7 g/dL    Hematocrit 39.9 (L) 40.0 - 53.0 %    MCV  86 78 - 100 fL    MCH 30.5 26.5 - 33.0 pg    MCHC 35.3 31.5 - 36.5 g/dL    RDW 12.1 10.0 - 15.0 %    Platelet Count 202 150 - 450 10e3/uL    % Neutrophils 52 %    % Lymphocytes 33 %    % Monocytes 11 %    % Eosinophils 3 %    % Basophils 1 %    % Immature Granulocytes 0 %    NRBCs per 100 WBC 0 <1 /100    Absolute Neutrophils 4.9 1.6 - 8.3 10e3/uL    Absolute Lymphocytes 3.1 0.8 - 5.3 10e3/uL    Absolute Monocytes 1.0 0.0 - 1.3 10e3/uL    Absolute Eosinophils 0.3 0.0 - 0.7 10e3/uL    Absolute Basophils 0.1 0.0 - 0.2 10e3/uL    Absolute Immature Granulocytes 0.0 <=0.4 10e3/uL    Absolute NRBCs 0.0 10e3/uL   Extra Blue Top Tube   Result Value Ref Range    Hold Specimen JIC    Extra Red Top Tube   Result Value Ref Range    Hold Specimen JIC    Asymptomatic Influenza A/B, RSV, & SARS-CoV2 PCR (COVID-19) Nose    Specimen: Nose; Swab   Result Value Ref Range    Influenza A PCR Negative Negative    Influenza B PCR Negative Negative    RSV PCR Negative Negative    SARS CoV2 PCR Negative Negative   EKG 12-lead, tracing only   Result Value Ref Range    Systolic Blood Pressure  mmHg    Diastolic Blood Pressure  mmHg    Ventricular Rate 46 BPM    Atrial Rate 46 BPM    UT Interval 118 ms    QRS Duration 94 ms     ms    QTc 397 ms    P Axis 71 degrees    R AXIS 72 degrees    T Axis 55 degrees    Interpretation ECG       Sinus bradycardia  Otherwise normal ECG  No previous ECGs available  Confirmed by MD SUSHMA, JERROD ([id number]) on 11/13/2023 4:43:22 PM       XR Chest Port 1 View   Final Result   IMPRESSION:    No acute findings.       THIS DOCUMENT HAS BEEN ELECTRONICALLY SIGNED BY JUDY VIZCAINO MD            EKG:    No prior EKG available for comparison. EKG reviewed at 1957.  1) Rhythm: Sinus bradycardia  2) Rate: ventricular rate 46 bpm.  3) QRS Axis: No axis deviation  4) P waves/ UT interval: Sinus. Nml SHAZIA. No atrial enlargement  5) QRS complex: Narrow. No BBB. No ventricular hypertrophy. No pathological Q waves.  6) ST  Segment: No acute ST segment elevation or depression  7) T waves: No T wave inversions. No peaked or flattened T waves.     I have independently reviewed and interpreted today's EKG, pending cardiologist over read.         I, Vincent Pantoja PA-C, personally performed the services described in this documentation, and it is both accurate and complete.       Lowell Pantoja PA-C  11/14/23 [id number]

## 2023-11-13 NOTE — DISCHARGE INSTRUCTIONS
-We will call you if your COVID test comes back positive  -Once you obtain a stool culture at home, please return here so we may analyze it in the lab for infectious bacteria  -Your blood work today was otherwise normal-appearing.  -Use Zofran 4 mg every 6 hours as needed for nausea.  -Return to the ER for any worsening of symptoms

## 2023-11-25 ENCOUNTER — HOSPITAL ENCOUNTER (EMERGENCY)
Facility: OTHER | Age: 43
Discharge: HOME | End: 2023-11-25
Attending: EMERGENCY MEDICINE | Admitting: EMERGENCY MEDICINE
Payer: MEDICARE

## 2023-11-25 VITALS
BODY MASS INDEX: 23.63 KG/M2 | OXYGEN SATURATION: 98 % | HEIGHT: 66 IN | TEMPERATURE: 99.3 F | DIASTOLIC BLOOD PRESSURE: 80 MMHG | HEART RATE: 60 BPM | RESPIRATION RATE: 16 BRPM | WEIGHT: 147 LBS | SYSTOLIC BLOOD PRESSURE: 123 MMHG

## 2023-11-25 DIAGNOSIS — U07.1 COVID-19: ICD-10-CM

## 2023-11-25 LAB — SARS-COV-2 RNA RESP QL NAA+PROBE: POSITIVE

## 2023-11-25 PROCEDURE — 99282 EMERGENCY DEPT VISIT SF MDM: CPT | Performed by: EMERGENCY MEDICINE

## 2023-11-25 PROCEDURE — 99283 EMERGENCY DEPT VISIT LOW MDM: CPT | Performed by: EMERGENCY MEDICINE

## 2023-11-25 PROCEDURE — 87635 SARS-COV-2 COVID-19 AMP PRB: CPT | Performed by: EMERGENCY MEDICINE

## 2023-11-25 ASSESSMENT — ENCOUNTER SYMPTOMS
WEAKNESS: 1
GASTROINTESTINAL NEGATIVE: 1
PSYCHIATRIC NEGATIVE: 1
MYALGIAS: 1
HEADACHES: 1
FATIGUE: 1
CHILLS: 1
SHORTNESS OF BREATH: 0
LIGHT-HEADEDNESS: 1
EYE REDNESS: 1
CARDIOVASCULAR NEGATIVE: 1
SORE THROAT: 0
FEVER: 1
HEMATOLOGIC/LYMPHATIC NEGATIVE: 1

## 2023-11-25 ASSESSMENT — ACTIVITIES OF DAILY LIVING (ADL): ADLS_ACUITY_SCORE: 35

## 2023-11-25 NOTE — ED PROVIDER NOTES
History     Chief Complaint   Patient presents with    Cough    Generalized Body Aches    Nausea    Dizziness    Chills    Headache     HPI  43 year old male with ho methamphetamine/THC use presents with fever, chills body aches and fatigue.  His wife was diagnosed with Covid on Wed. No vomiting or abdominal pain. No SOB or cough. He is not vaccinated. This is the second time he has had this.  Patient seen in EMS 1.      Allergies:  Allergies   Allergen Reactions    Penicillins Hives and Rash    Ketorolac Hives    Tramadol Hives and Nausea and Vomiting     rash       Problem List:    Patient Active Problem List    Diagnosis Date Noted    Methamphetamine abuse in remission (H) 08/17/2023     Priority: Medium    Other cerebral palsy (H) 08/17/2023     Priority: Medium    Finger amputation, no complication 08/17/2023     Priority: Medium    PTSD (post-traumatic stress disorder) 08/17/2023     Priority: Medium    Medical cannabis use 08/17/2023     Priority: Medium        Past Medical History:    No past medical history on file.    Past Surgical History:    No past surgical history on file.    Family History:    No family history on file.    Social History:  Marital Status:  Single [1]  Social History     Tobacco Use    Smoking status: Every Day     Packs/day: .25     Types: Cigarettes    Smokeless tobacco: Former   Vaping Use    Vaping Use: Never used   Substance Use Topics    Alcohol use: Not Currently     Comment: quite drinking 15 years ago    Drug use: Not Currently     Types: Methamphetamines, Marijuana     Comment: former meth user 3 years clean        Medications:    dibucaine 1 % OINT rectal ointment  medical cannabis (Patient's own supply)  ondansetron (ZOFRAN ODT) 4 MG ODT tab          Review of Systems   Constitutional:  Positive for chills, fatigue and fever.   HENT:  Positive for congestion. Negative for sore throat.    Eyes:  Positive for redness.   Respiratory:  Negative for shortness of breath.   "  Cardiovascular: Negative.    Gastrointestinal: Negative.    Genitourinary: Negative.    Musculoskeletal:  Positive for myalgias.   Neurological:  Positive for weakness, light-headedness and headaches.   Hematological: Negative.    Psychiatric/Behavioral: Negative.         Physical Exam   BP: 123/80  Pulse: 60  Temp: 99.3  F (37.4  C)  Resp: 16  Height: 167.6 cm (5' 6\")  Weight: 66.7 kg (147 lb)  SpO2: 98 %      Physical Exam  Vitals and nursing note reviewed. Exam conducted with a chaperone present.   Constitutional:       General: He is not in acute distress.     Appearance: Normal appearance. He is not toxic-appearing.   HENT:      Head: Atraumatic.      Mouth/Throat:      Mouth: Mucous membranes are dry.      Pharynx: Oropharynx is clear.   Eyes:      General: No scleral icterus.     Extraocular Movements: Extraocular movements intact.      Conjunctiva/sclera: Conjunctivae normal.      Pupils: Pupils are equal, round, and reactive to light.   Cardiovascular:      Rate and Rhythm: Normal rate and regular rhythm.      Pulses: Normal pulses.      Heart sounds: Normal heart sounds.   Pulmonary:      Effort: Pulmonary effort is normal. No respiratory distress.      Breath sounds: Normal breath sounds.   Abdominal:      Palpations: Abdomen is soft.      Tenderness: There is no abdominal tenderness.   Musculoskeletal:         General: No deformity. Normal range of motion.      Cervical back: Normal range of motion and neck supple.   Skin:     General: Skin is warm.   Neurological:      General: No focal deficit present.      Mental Status: He is alert and oriented to person, place, and time.   Psychiatric:         Mood and Affect: Mood normal.         Behavior: Behavior normal.         ED Course   Looks well, not toxic. Sleeping on floor of room. No weakness currently. Needs work note as he is positive for Covid. Declines offer for paxlovid and will treat supportively.  He does appear to understand discharge " instruction as well as return precautions.               Results for orders placed or performed during the hospital encounter of 11/25/23 (from the past 24 hour(s))   Symptomatic COVID-19 Virus (Coronavirus) by PCR Nose    Specimen: Nose; Swab   Result Value Ref Range    SARS CoV2 PCR Positive (A) Negative    Narrative    Testing was performed using the Xpert Xpress SARS-CoV-2 Assay on the Cepheid Gene-Xpert Instrument Systems. Additional information about this Emergency Use Authorization (EUA) assay can be found via the Lab Guide. This test should be ordered for the detection of SARS-CoV-2 in individuals who meet SARS-CoV-2 clinical and/or epidemiological criteria as well as from individuals without symptoms or other reasons to suspect COVID-19. Test performance for asymptomatic patients has only been established in anterior nasal swab specimens. This test is for in vitro diagnostic use under the FDA EUA for laboratories certified under CLIA to perform high complexity testing. This test has not been FDA cleared or approved. A negative result does not rule out the presence of PCR inhibitors in the specimen or target RNA concentration below the limit of detection for the assay. The possibility of a false negative should be considered if the patient's recent exposure or clinical presentation suggests COVID-19. This test was validated by [facility]. This laboratory is certified under the Clinical Laboratory Improvement Amendments (CLIA) as qualified to perform high complexity clinical laboratory testing.       Medications - No data to display    Assessments & Plan (with Medical Decision Making)     I have reviewed the nursing notes.    I have reviewed the findings, diagnosis, plan and need for follow up with the patient.          New Prescriptions    No medications on file       Final diagnoses:   COVID-19       11/25/2023   [facility]        Sarah Pitt, DO  11/25/23 0226       Sarah Pitt, DO  11/25/23 0707

## 2023-11-25 NOTE — DISCHARGE INSTRUCTIONS
Drink plenty of fluids.  Tylenol and ibuprofen for fever, body aches as needed.  Rest as you are able.

## 2023-11-25 NOTE — ED TRIAGE NOTES
Pt arrived to ER with complaints of COVID symptoms; cough, headache, body chills, nausea and dizziness.  Symptoms began two days ago. Pt states that girlfriend tested positive for COVID on Thursday. Pt is not vaccinated against COVID.      Triage Assessment (Adult)       Row Name 11/25/23 0050          Triage Assessment    Airway WDL WDL        Respiratory WDL    Respiratory WDL WDL        Skin Circulation/Temperature WDL    Skin Circulation/Temperature WDL WDL        Cardiac WDL    Cardiac WDL WDL        Peripheral/Neurovascular WDL    Peripheral Neurovascular WDL WDL        Cognitive/Neuro/Behavioral WDL    Cognitive/Neuro/Behavioral WDL WDL

## 2023-11-25 NOTE — Clinical Note
Omar Hay was seen and treated in our emergency department on 11/25/2023.  He may return to work on 11/27/2023.       If you have any questions or concerns, please don't hesitate to call.      Sarah Pitt, DO

## 2023-11-25 NOTE — Clinical Note
Omar Hay was seen and treated in our emergency department on 11/25/2023.  He may return to work on 11/29/2023.       If you have any questions or concerns, please don't hesitate to call.      Sarah Pitt, DO

## 2024-01-05 ENCOUNTER — HOSPITAL ENCOUNTER (EMERGENCY)
Facility: OTHER | Age: 44
Discharge: HOME | End: 2024-01-05
Attending: STUDENT IN AN ORGANIZED HEALTH CARE EDUCATION/TRAINING PROGRAM | Admitting: STUDENT IN AN ORGANIZED HEALTH CARE EDUCATION/TRAINING PROGRAM
Payer: OTHER MISCELLANEOUS

## 2024-01-05 ENCOUNTER — APPOINTMENT (OUTPATIENT)
Dept: GENERAL RADIOLOGY | Facility: OTHER | Age: 44
End: 2024-01-05
Attending: STUDENT IN AN ORGANIZED HEALTH CARE EDUCATION/TRAINING PROGRAM
Payer: OTHER MISCELLANEOUS

## 2024-01-05 VITALS
BODY MASS INDEX: 24.27 KG/M2 | DIASTOLIC BLOOD PRESSURE: 69 MMHG | WEIGHT: 151 LBS | TEMPERATURE: 97.3 F | RESPIRATION RATE: 20 BRPM | SYSTOLIC BLOOD PRESSURE: 114 MMHG | HEIGHT: 66 IN | HEART RATE: 62 BPM | OXYGEN SATURATION: 97 %

## 2024-01-05 DIAGNOSIS — M54.50 LUMBAR BACK PAIN: ICD-10-CM

## 2024-01-05 DIAGNOSIS — M25.551 BILATERAL HIP PAIN: ICD-10-CM

## 2024-01-05 DIAGNOSIS — M25.552 BILATERAL HIP PAIN: ICD-10-CM

## 2024-01-05 PROCEDURE — 250N000013 HC RX MED GY IP 250 OP 250 PS 637: Performed by: STUDENT IN AN ORGANIZED HEALTH CARE EDUCATION/TRAINING PROGRAM

## 2024-01-05 PROCEDURE — 72100 X-RAY EXAM L-S SPINE 2/3 VWS: CPT | Mod: TC

## 2024-01-05 PROCEDURE — 99283 EMERGENCY DEPT VISIT LOW MDM: CPT | Performed by: STUDENT IN AN ORGANIZED HEALTH CARE EDUCATION/TRAINING PROGRAM

## 2024-01-05 PROCEDURE — 73522 X-RAY EXAM HIPS BI 3-4 VIEWS: CPT | Mod: TC

## 2024-01-05 RX ORDER — OXYCODONE AND ACETAMINOPHEN 5; 325 MG/1; MG/1
1 TABLET ORAL ONCE
Status: COMPLETED | OUTPATIENT
Start: 2024-01-05 | End: 2024-01-05

## 2024-01-05 RX ADMIN — OXYCODONE HYDROCHLORIDE AND ACETAMINOPHEN 1 TABLET: 5; 325 TABLET ORAL at 21:06

## 2024-01-05 RX ADMIN — IBUPROFEN 600 MG: 200 TABLET, FILM COATED ORAL at 20:06

## 2024-01-05 ASSESSMENT — ACTIVITIES OF DAILY LIVING (ADL): ADLS_ACUITY_SCORE: 35

## 2024-01-05 NOTE — Clinical Note
Omar Hay was seen and treated in our emergency department on 1/5/2024.  He may return to work on 01/08/2024.  May return sooner if feels able to     If you have any questions or concerns, please don't hesitate to call.      Luis Enrique London MD

## 2024-01-06 NOTE — ED PROVIDER NOTES
History     Chief Complaint   Patient presents with    Back Pain    Hip Pain    Fall       Omar Hay is a 43 year old male who presents with back and hip pain after fall.  Mechanical fall at 1245 slipping on the ice landing on his left hip.  Severe pain over bilateral hips and midline lumbar spine.  He had some numbness and paresthesias in his left lower extremity when driving.  Denies any weakness.  Able to ambulate without significant difficulty.  Pain is severe.  No home treatments tried.  No bowel or bladder changes.  Denies any other injuries.  Denies cramping.    Allergies   Allergen Reactions    Penicillins Hives and Rash    Ketorolac Hives    Tramadol Hives and Nausea and Vomiting     rash       Patient Active Problem List    Diagnosis Date Noted    Methamphetamine abuse in remission (H) 08/17/2023     Priority: Medium    Other cerebral palsy (H) 08/17/2023     Priority: Medium    Finger amputation, no complication 08/17/2023     Priority: Medium    PTSD (post-traumatic stress disorder) 08/17/2023     Priority: Medium    Medical cannabis use 08/17/2023     Priority: Medium       History reviewed. No pertinent past medical history.    History reviewed. No pertinent surgical history.    History reviewed. No pertinent family history.    Social History     Tobacco Use    Smoking status: Every Day     Packs/day: .25     Types: Cigarettes    Smokeless tobacco: Former   Vaping Use    Vaping Use: Never used   Substance Use Topics    Alcohol use: Not Currently     Comment: quite drinking 15 years ago    Drug use: Not Currently     Types: Methamphetamines, Marijuana     Comment: former meth user 3 years clean       Medications:    dibucaine 1 % OINT rectal ointment  medical cannabis (Patient's own supply)  ondansetron (ZOFRAN ODT) 4 MG ODT tab        Review of Systems: See HPI for pertinent negatives and positives. All other systems reviewed and found to be negative.    Physical Exam   /69   Pulse  "62   Temp 97.3  F (36.3  C) (Tympanic)   Resp 20   Ht 1.676 m (5' 6\")   Wt 68.5 kg (151 lb)   SpO2 97%   BMI 24.37 kg/m       General: awake, uncomfortable  HEENT: atraumatic  Respiratory: normal effort  Cardiovascular: lower extremities appear well perfused being warm without discoloration  Abdomen: soft, nondistended, nontender  Back: Lumbar spinal tenderness  Extremities: no deformities, edema, tenderness in hip joints with internal/external rotation of lower extremities  Skin: warm, dry, skin intact, no ecchymosis  Neuro: alert, normal bilateral lower extremity strength and sensation, symmetric 1+ patellar DTRs, no focal deficits  Psych: appropriate mood and affect    ED Course      Results for orders placed or performed during the hospital encounter of 01/05/24 (from the past 24 hour(s))   XR Pelvis and Hip Bilateral 2 Views    Narrative    PROCEDURE INFORMATION:   Exam: XR Bilateral Hips   Exam date and time: 1/5/2024 9:31 PM   Age: 43 years old   Clinical indication: Injury or trauma; Other: Bilateral hip pain S/P fall     TECHNIQUE:   Imaging protocol: Radiologic exam of the bilateral hips.   Views: 5 or more views of hips with pelvis when performed.     COMPARISON:   CR XR LUMBAR SPINE 2/3 VIEWS 1/5/2024 9:30 PM     FINDINGS:   Bones/joints: Unremarkable. No acute fracture.   Soft tissues: Unremarkable.       Impression    IMPRESSION:   No acute findings.     THIS DOCUMENT HAS BEEN ELECTRONICALLY SIGNED BY JUDY VIZCAINO MD   XR Lumbar Spine 2/3 Views    Narrative    PROCEDURE INFORMATION:   Exam: XR Lumbosacral Spine   Exam date and time: 1/5/2024 9:30 PM   Age: 43 years old   Clinical indication: Injury or trauma; Other: Lumbar spine pain with lle   paresthesias S/P fall     TECHNIQUE:   Imaging protocol: Radiologic exam of the lumbosacral spine.   Views: 2 or 3 views.     COMPARISON:   No relevant prior studies available.     FINDINGS:   Bones/joints: Normal. No acute fracture. Normal alignment. "   Soft tissues: Unremarkable.       Impression    IMPRESSION:   No acute findings.     THIS DOCUMENT HAS BEEN ELECTRONICALLY SIGNED BY JUDY VIZCAINO MD       Medications   ibuprofen (ADVIL/MOTRIN) tablet 600 mg (600 mg Oral $Given 1/5/24 2006)   oxyCODONE-acetaminophen (PERCOCET) 5-325 MG per tablet 1 tablet (1 tablet Oral $Given 1/5/24 5351)       Assessments & Plan (with Medical Decision Making)     I have reviewed the nursing notes.          43 year old male evaluated for lumbar spinal pain and bilateral hip pain after mechanical fall. Evaluation remarkable for lumbar spinal pain and bilateral hip joint pain with internal/external rotation of lower extremities.  Patient was ambulatory which is reassuring.  No numbness or weakness or bowel or bladder changes.  Treated per above with improvement.  X-rays negative for acute fracture. Discharged home with below plan including ED return precautions.     I have reviewed the findings, diagnosis, plan, and need for any follow up with the patient.    Patient instructions:   Your x-rays did not show any broken bones. If not improving significantly after 1-2 weeks please follow up with primary care provider, orthopedics, or ER. On rare occasions there can be a fracture (bone break) that is not seen on x-ray or a ligament/tendon/cartilage tear that may require surgery. You can do activities as tolerated and avoid activity that causes pain.     As tolerate, take NSAID (e.g. ibuprofen) taken with food every 6 hours as needed for pain and add tylenol 1000 mg every 6 hours as needed for extra pain control. Ice regularly over the next 2 days for 20 minutes at a time at least 3-4x's per day. After that you can try using heat.    Return to ER if pain significantly worsens or you develop numbness or weakness in your extremity or concerning swelling or intolerable pain.        New Prescriptions    No medications on file       Final diagnoses:   Lumbar back pain   Bilateral hip pain        1/5/2024   [facility]     Luis Enrique London MD  01/05/24 9865

## 2024-01-06 NOTE — DISCHARGE INSTRUCTIONS
Your x-rays did not show any broken bones. If not improving significantly after 1-2 weeks please follow up with primary care provider, orthopedics, or ER. On rare occasions there can be a fracture (bone break) that is not seen on x-ray or a ligament/tendon/cartilage tear that may require surgery. You can do activities as tolerated and avoid activity that causes pain.     As tolerate, take NSAID (e.g. ibuprofen) taken with food every 6 hours as needed for pain and add tylenol 1000 mg every 6 hours as needed for extra pain control. Ice regularly over the next 2 days for 20 minutes at a time at least 3-4x's per day. After that you can try using heat.    Return to ER if pain significantly worsens or you develop numbness or weakness in your extremity or concerning swelling or intolerable pain.

## 2024-01-11 ENCOUNTER — OFFICE VISIT (OUTPATIENT)
Dept: FAMILY MEDICINE | Facility: OTHER | Age: 44
End: 2024-01-11
Attending: CHIROPRACTOR
Payer: OTHER MISCELLANEOUS

## 2024-01-11 VITALS
SYSTOLIC BLOOD PRESSURE: 126 MMHG | DIASTOLIC BLOOD PRESSURE: 81 MMHG | BODY MASS INDEX: 24.16 KG/M2 | HEIGHT: 65 IN | WEIGHT: 145 LBS | OXYGEN SATURATION: 97 % | RESPIRATION RATE: 17 BRPM | HEART RATE: 76 BPM
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
## 2024-01-11 DIAGNOSIS — M99.05 SEGMENTAL DYSFUNCTION OF PELVIC REGION: ICD-10-CM
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
## 2024-01-11 PROCEDURE — 99213 OFFICE O/P EST LOW 20 MIN: CPT | Performed by: CHIROPRACTOR

## 2024-01-11 ASSESSMENT — PAIN SCALES - GENERAL: PAINLEVEL: EXTREME PAIN (8)

## 2024-01-11 ASSESSMENT — PATIENT HEALTH QUESTIONNAIRE - PHQ9
SUM OF ALL RESPONSES TO PHQ QUESTIONS 1-9: 16
SUM OF ALL RESPONSES TO PHQ QUESTIONS 1-9: 16
10. IF YOU CHECKED OFF ANY PROBLEMS, HOW DIFFICULT HAVE THESE PROBLEMS MADE IT FOR YOU TO DO YOUR WORK, TAKE CARE OF THINGS AT HOME, OR GET ALONG WITH OTHER PEOPLE: EXTREMELY DIFFICULT

## 2024-01-11 NOTE — PROGRESS NOTES
CHIEF COMPLAINT:   Chief Complaint   Patient presents with    Work Comp       HISTORY OF PRESENTING WORK INJURY     Omar is a new patient to me presenting for follow-up from an injury occurring on January 5, 2024.  He works at the local [employer] and slipped on a icy curb outside of the facility while on duty.  He landed more on his left lower back and went to the ED shortly thereafter.  X-rays were obtained and negative for fracture.  He was given a note to return to work but indicates his pain has worsened and is now having left-sided leg radiculopathy.  Most of the pain in the low back is concentrated to the left SI joint.  He has been trying home remedies but is not finding improvement with these.  He denies any bowel or bladder changes or saddle anesthesia.  Denies any significant motor weakness in the lower extremities.  He is accompanied by his peer counselor from [facility].    Oswestry (MANDA) Questionnaire        1/11/2024     1:42 PM   OSWESTRY DISABILITY INDEX   Count 10   Sum 34   Oswestry Score (%) 68 %        PAST MEDICAL HISTORY:  History reviewed. No pertinent past medical history.    PAST SURGICAL HISTORY:  History reviewed. No pertinent surgical history.    ALLERGIES:  Allergies   Allergen Reactions    Penicillins Hives and Rash    Ketorolac Hives    Tramadol Hives and Nausea and Vomiting     rash       CURRENT MEDICATIONS:  Current Outpatient Medications   Medication Sig Dispense Refill    dibucaine 1 % OINT rectal ointment Place rectally 3 times daily as needed for hemorrhoids 60 g 1    medical cannabis (Patient's own supply) See Admin Instructions (The purpose of this order is to document that the patient reports taking medical cannabis.  This is not a prescription, and is not used to certify that the patient has a qualifying medical condition.)      ondansetron (ZOFRAN ODT) 4 MG ODT tab Take 1 tablet (4 mg) by mouth every 6 hours as needed for nausea 10 tablet  0       SOCIAL HISTORY:  Social History     Socioeconomic History    Marital status: Single     Spouse name: Not on file    Number of children: Not on file    Years of education: Not on file    Highest education level: Not on file   Occupational History    Not on file   Tobacco Use    Smoking status: Every Day     Packs/day: .25     Types: Cigarettes    Smokeless tobacco: Former   Vaping Use    Vaping Use: Never used   Substance and Sexual Activity    Alcohol use: Not Currently     Comment: quite drinking 15 years ago    Drug use: Not Currently     Types: Methamphetamines, Marijuana     Comment: former meth user 3 years clean    Sexual activity: Yes     Partners: Female   Other Topics Concern    Not on file   Social History Narrative    Not on file     Social Determinants of Health     Financial Resource Strain: Low Risk  (10/3/2023)    Financial Resource Strain     Within the past 12 months, have you or your family members you live with been unable to get utilities (heat, electricity) when it was really needed?: No   Food Insecurity: Unknown (10/3/2023)    Food Insecurity     Within the past 12 months, did you worry that your food would run out before you got money to buy more?: Patient refused     Within the past 12 months, did the food you bought just not last and you didn t have money to get more?: Patient refused   Transportation Needs: Low Risk  (10/3/2023)    Transportation Needs     Within the past 12 months, has lack of transportation kept you from medical appointments, getting your medicines, non-medical meetings or appointments, work, or from getting things that you need?: No   Physical Activity: Not on file   Stress: Not on file   Social Connections: Not on file   Interpersonal Safety: Low Risk  (1/11/2024)    Interpersonal Safety     Do you feel physically and emotionally safe where you currently live?: Yes     Within the past 12 months, have you been hit, slapped, kicked or otherwise physically hurt by  "someone?: No     Within the past 12 months, have you been humiliated or emotionally abused in other ways by your partner or ex-partner?: No   Housing Stability: High Risk (10/3/2023)    Housing Stability     Do you have housing? : Yes     Are you worried about losing your housing?: Yes       FAMILY HISTORY:  History reviewed. No pertinent family history.    REVIEW OF SYSTEMS:    Unremarkable other than chief complaint      PHYSICAL EXAM:   /81   Pulse 76   Resp 17   Ht 1.651 m (5' 5\")   Wt 65.8 kg (145 lb)   SpO2 97%   BMI 24.13 kg/m   Body mass index is 24.13 kg/m . General Appearance: Difficulty with transitions.  Strength is moderately affected bilaterally in the lower extremities and I would rate this as 3/5.  I did not appreciate full effort on the part of the patient and that there is some symptom magnification with these test.  Symptom magnification also exist with palpation to the lumbar spine on the left side.  There is no bruising on the skin nor do I palpate spasm.  There is segmental joint dysfunction occurring on x-ray with retrolisthesis of L5 upon sacrum.      IMPRESSION/PLAN:    Chiropractic is appropriate in management of this case.  He elects to have this performed at [facility] and referrals placed.  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  Work restrictions and treatment to schedule until January 29, 2024 at which time we will do reassessment and workability updated.  He had no additional questions and I gave him 2 copies of the workability form    Total time spent today in chart review/preparation, face to face evaluation, and documentation: 30 minutes.        Rohit Andrade DC, ALLI, BENEDICT  Director - Occupational Medicine Department  Diplomate of the American Board of Forensic Professionals  Board Certified - American Board of Independent Medical Examiners    2:39 PM " 1/11/2024

## 2024-01-27 ENCOUNTER — HOSPITAL ENCOUNTER (EMERGENCY)
Facility: OTHER | Age: 44
Discharge: HOME | End: 2024-01-27
Attending: FAMILY MEDICINE | Admitting: FAMILY MEDICINE
Payer: MEDICARE

## 2024-01-27 VITALS
RESPIRATION RATE: 18 BRPM | SYSTOLIC BLOOD PRESSURE: 104 MMHG | TEMPERATURE: 97.1 F | HEART RATE: 45 BPM | DIASTOLIC BLOOD PRESSURE: 59 MMHG | BODY MASS INDEX: 23.54 KG/M2 | WEIGHT: 150 LBS | OXYGEN SATURATION: 96 % | HEIGHT: 67 IN

## 2024-01-27 DIAGNOSIS — M54.50 ACUTE RIGHT-SIDED LOW BACK PAIN WITHOUT SCIATICA: ICD-10-CM

## 2024-01-27 PROBLEM — G80.8 OTHER CEREBRAL PALSY (H): Status: ACTIVE | Noted: 2023-08-17

## 2024-01-27 PROBLEM — H57.02 ANISOCORIA: Status: ACTIVE | Noted: 2017-10-19

## 2024-01-27 PROBLEM — F15.11 METHAMPHETAMINE ABUSE IN REMISSION (H): Status: ACTIVE | Noted: 2023-08-17

## 2024-01-27 PROBLEM — Z79.899 MEDICAL CANNABIS USE: Status: ACTIVE | Noted: 2023-08-17

## 2024-01-27 PROCEDURE — 99283 EMERGENCY DEPT VISIT LOW MDM: CPT | Performed by: FAMILY MEDICINE

## 2024-01-27 PROCEDURE — 250N000013 HC RX MED GY IP 250 OP 250 PS 637: Performed by: FAMILY MEDICINE

## 2024-01-27 RX ORDER — ACETAMINOPHEN 325 MG/1
650 TABLET ORAL ONCE
Status: COMPLETED | OUTPATIENT
Start: 2024-01-27 | End: 2024-01-27

## 2024-01-27 RX ORDER — CYCLOBENZAPRINE HCL 10 MG
10 TABLET ORAL 3 TIMES DAILY PRN
Qty: 30 TABLET | Refills: 0 | Status: SHIPPED | OUTPATIENT
Start: 2024-01-27 | End: 2024-03-19

## 2024-01-27 RX ORDER — CYCLOBENZAPRINE HCL 10 MG
10 TABLET ORAL ONCE
Status: COMPLETED | OUTPATIENT
Start: 2024-01-27 | End: 2024-01-27

## 2024-01-27 RX ADMIN — CYCLOBENZAPRINE 10 MG: 10 TABLET, FILM COATED ORAL at 04:27

## 2024-01-27 RX ADMIN — ACETAMINOPHEN 650 MG: 325 TABLET, FILM COATED ORAL at 04:27

## 2024-01-27 ASSESSMENT — ENCOUNTER SYMPTOMS
JOINT SWELLING: 0
CONSTIPATION: 0
RECTAL PAIN: 0
FEVER: 0
DYSURIA: 0
SEIZURES: 0
NUMBNESS: 0
VOMITING: 0
DIFFICULTY URINATING: 0
DIARRHEA: 0
BACK PAIN: 1
ACTIVITY CHANGE: 1
CHILLS: 0
NAUSEA: 0

## 2024-01-27 ASSESSMENT — ACTIVITIES OF DAILY LIVING (ADL): ADLS_ACUITY_SCORE: 39

## 2024-01-27 NOTE — DISCHARGE INSTRUCTIONS
Keep your appointment on Monday.  You definitely have a component of muscle spasm contributing to your symptoms.  You may want to consider an MRI if you are having more symptoms as this will better show structures of the nerves and discs in the spine.  If you have severe pain associated with fever, chills, inability to urinate or bowel or bladder incontinence please return to the emergency room right away.

## 2024-01-27 NOTE — ED PROVIDER NOTES
History     Chief Complaint   Patient presents with    Back Pain     HPI  Omar Hay is a 43 year old male who presents for back pain.  He had a fall about 3 weeks ago.  Was seen in the emergency room.  Imaging was done at that time was negative for fracture.  He is doing well at home up to his usual activity until yesterday.  He was at a meeting turn and felt severe pain in the middle of his back.  Has muscle spasms bilaterally of the lower back.  Having difficult time walking due to the pain.  Right leg seems to be more symptomatic than the left leg.  He has not tried anything at home for his symptoms yet.  He is accompanied by his girlfriend.  He denies bowel or bladder incontinence.  No fevers or chills.  No new trauma.    Allergies:  Allergies   Allergen Reactions    Penicillins Hives and Rash    Ketorolac Hives    Tramadol Hives and Nausea and Vomiting     rash       Problem List:    Patient Active Problem List    Diagnosis Date Noted    Methamphetamine abuse in remission (H) 08/17/2023     Priority: Medium    Other cerebral palsy (H) 08/17/2023     Priority: Medium    Finger amputation, no complication 08/17/2023     Priority: Medium    PTSD (post-traumatic stress disorder) 08/17/2023     Priority: Medium    Medical cannabis use 08/17/2023     Priority: Medium    Anisocoria 10/19/2017     Priority: Medium     Formatting of this note might be different from the original.   Left pupil > right pupil          Past Medical History:    No past medical history on file.    Past Surgical History:    No past surgical history on file.    Family History:    No family history on file.    Social History:  Marital Status:  Single [1]  Social History     Tobacco Use    Smoking status: Every Day     Packs/day: .25     Types: Cigarettes    Smokeless tobacco: Former   Vaping Use    Vaping Use: Never used   Substance Use Topics    Alcohol use: Not Currently     Comment: quite drinking 15 years ago    Drug use: Not  "Currently     Types: Methamphetamines, Marijuana     Comment: former meth user 3 years clean        Medications:    cyclobenzaprine (FLEXERIL) 10 MG tablet  dibucaine 1 % OINT rectal ointment  medical cannabis (Patient's own supply)  ondansetron (ZOFRAN ODT) 4 MG ODT tab          Review of Systems   Constitutional:  Positive for activity change. Negative for chills and fever.   Gastrointestinal:  Negative for constipation, diarrhea, nausea, rectal pain and vomiting.   Genitourinary:  Negative for difficulty urinating and dysuria.   Musculoskeletal:  Positive for back pain. Negative for joint swelling.   Skin:  Negative for rash.   Neurological:  Negative for seizures and numbness.       Physical Exam   BP: 114/76  Pulse: 60  Temp: 97.1  F (36.2  C)  Resp: 18  Height: 170.2 cm (5' 7\")  Weight: 68 kg (150 lb)  SpO2: 98 %      Physical Exam  Constitutional:       General: He is not in acute distress.     Appearance: Normal appearance. He is not toxic-appearing.   HENT:      Head: Normocephalic and atraumatic.   Eyes:      General: No scleral icterus.     Conjunctiva/sclera: Conjunctivae normal.   Cardiovascular:      Rate and Rhythm: Normal rate and regular rhythm.      Heart sounds: Normal heart sounds.   Pulmonary:      Effort: Pulmonary effort is normal. No respiratory distress.      Breath sounds: Normal breath sounds.   Abdominal:      Palpations: Abdomen is soft.      Tenderness: There is no abdominal tenderness.   Musculoskeletal:         General: No deformity.      Cervical back: Neck supple.      Comments: Patient lying on his left side.  Has increased pain with motion of the right leg.  Testing limited due to discomfort.  He has tenderness to palpation of the right hip bilateral paraspinal muscles.  There is no spinous process tenderness to palpation.   Skin:     General: Skin is warm.   Neurological:      Mental Status: He is alert.         ED Course              ED Course as of 01/27/24 0509   Sat Jan 27, " 2024 0420 Patient slipped and fell 3 weeks ago.  Had imaging at that time which was negative.  He was actually doing well improving until last night when he was at a meeting he turned and had sudden onset of severe pain again in the midline.  No fall or trauma.   0509 Patient feeling improved after Flexeril.  Would like to discharge home.     Procedures              Critical Care time:  none               No results found for this or any previous visit (from the past 24 hour(s)).    Medications   cyclobenzaprine (FLEXERIL) tablet 10 mg (10 mg Oral $Given 1/27/24 0427)   acetaminophen (TYLENOL) tablet 650 mg (650 mg Oral $Given 1/27/24 0427)       Assessments & Plan (with Medical Decision Making)     I have reviewed the nursing notes.    I have reviewed the findings, diagnosis, plan and need for follow up with the patient.           Medical Decision Making  The patient's presentation was of low complexity (an acute and uncomplicated illness or injury).    The patient's evaluation involved:  history and exam without other MDM data elements    The patient's management necessitated moderate risk (prescription drug management including medications given in the ED).        New Prescriptions    CYCLOBENZAPRINE (FLEXERIL) 10 MG TABLET    Take 1 tablet (10 mg) by mouth 3 times daily as needed for muscle spasms       Final diagnoses:   Acute right-sided low back pain without sciatica   Muscle spasm.  Improved with cyclobenzaprine.  He had a fall few weeks ago.  I reviewed the imaging.  There were no acute fractures.  He has a follow-up appointment on Monday.  If he is having persistent symptoms could consider additional imaging such as CT or MRI.  He will discuss this at his follow-up appointment on Monday.  Red flag signs and symptoms discussed as per AVS.  Patient understands and agrees with the plan as listed.    1/27/2024   [facility]       Modesta Barnes DO  01/27/24 4923

## 2024-01-27 NOTE — ED TRIAGE NOTES
"Pt presents for back pain, mainly lower back to the right side, denies changes in urination, reports a fall about 3 weeks ago and was recovering, then with a position change tonight felt a sensation in the back like it was going to buckle, increased pain.     /76   Pulse 60   Temp 97.1  F (36.2  C) (Tympanic)   Resp 18   Ht 1.702 m (5' 7\")   Wt 68 kg (150 lb)   SpO2 98%   BMI 23.49 kg/m         Triage Assessment (Adult)       Row Name 01/27/24 0359          Triage Assessment    Airway WDL WDL        Respiratory WDL    Respiratory WDL WDL        Skin Circulation/Temperature WDL    Skin Circulation/Temperature WDL WDL        Cardiac WDL    Cardiac WDL WDL        Peripheral/Neurovascular WDL    Peripheral Neurovascular WDL WDL        Cognitive/Neuro/Behavioral WDL    Cognitive/Neuro/Behavioral WDL WDL                     "

## 2024-01-29 ENCOUNTER — OFFICE VISIT (OUTPATIENT)
Dept: FAMILY MEDICINE | Facility: OTHER | Age: 44
End: 2024-01-29
Attending: CHIROPRACTOR
Payer: OTHER MISCELLANEOUS

## 2024-01-29 DIAGNOSIS — M99.05 SEGMENTAL DYSFUNCTION OF PELVIC REGION: ICD-10-CM
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
## 2024-01-29 PROCEDURE — 99213 OFFICE O/P EST LOW 20 MIN: CPT | Performed by: CHIROPRACTOR

## 2024-01-29 NOTE — PROGRESS NOTES
"CHIEF COMPLAINT:   Chief Complaint   Patient presents with    Work Comp       HISTORY OF PRESENTING WORK INJURY     Omar returns today quite frustrated as he has not yet had authorization to be treated due to a delay possibly on the part of Worker's Compensation.  He still has the same level of pain and actually had to go to the emergency room due to acute pain during the night.  He states \"I am done and want to be released\".  He wants no part of ongoing Worker's Compensation due to his frustration with this claim.  He also states he wants to pay for chiropractic treatment out of his own pocket just to get fixed.  No new symptoms other than previously noted.        PAST MEDICAL HISTORY:  History reviewed. No pertinent past medical history.    PAST SURGICAL HISTORY:  History reviewed. No pertinent surgical history.    ALLERGIES:  Allergies   Allergen Reactions    Penicillins Hives and Rash    Ketorolac Hives    Tramadol Hives and Nausea and Vomiting     rash       CURRENT MEDICATIONS:  Current Outpatient Medications   Medication Sig Dispense Refill    cyclobenzaprine (FLEXERIL) 10 MG tablet Take 1 tablet (10 mg) by mouth 3 times daily as needed for muscle spasms 30 tablet 0    dibucaine 1 % OINT rectal ointment Place rectally 3 times daily as needed for hemorrhoids 60 g 1    medical cannabis (Patient's own supply) See Admin Instructions (The purpose of this order is to document that the patient reports taking medical cannabis.  This is not a prescription, and is not used to certify that the patient has a qualifying medical condition.)      ondansetron (ZOFRAN ODT) 4 MG ODT tab Take 1 tablet (4 mg) by mouth every 6 hours as needed for nausea 10 tablet 0       SOCIAL HISTORY:  Social History     Socioeconomic History    Marital status: Single     Spouse name: Not on file    Number of children: Not on file    Years of education: Not on file    Highest education level: Not on file   Occupational History    Not on file "   Tobacco Use    Smoking status: Every Day     Packs/day: .25     Types: Cigarettes    Smokeless tobacco: Former   Vaping Use    Vaping Use: Never used   Substance and Sexual Activity    Alcohol use: Not Currently     Comment: quite drinking 15 years ago    Drug use: Not Currently     Types: Methamphetamines, Marijuana     Comment: former meth user 3 years clean    Sexual activity: Yes     Partners: Female   Other Topics Concern    Not on file   Social History Narrative    Not on file     Social Determinants of Health     Financial Resource Strain: Low Risk  (10/3/2023)    Financial Resource Strain     Within the past 12 months, have you or your family members you live with been unable to get utilities (heat, electricity) when it was really needed?: No   Food Insecurity: Unknown (10/3/2023)    Food Insecurity     Within the past 12 months, did you worry that your food would run out before you got money to buy more?: Patient refused     Within the past 12 months, did the food you bought just not last and you didn t have money to get more?: Patient refused   Transportation Needs: Low Risk  (10/3/2023)    Transportation Needs     Within the past 12 months, has lack of transportation kept you from medical appointments, getting your medicines, non-medical meetings or appointments, work, or from getting things that you need?: No   Physical Activity: Not on file   Stress: Not on file   Social Connections: Not on file   Interpersonal Safety: Low Risk  (1/29/2024)    Interpersonal Safety     Do you feel physically and emotionally safe where you currently live?: Yes     Within the past 12 months, have you been hit, slapped, kicked or otherwise physically hurt by someone?: No     Within the past 12 months, have you been humiliated or emotionally abused in other ways by your partner or ex-partner?: No   Housing Stability: High Risk (10/3/2023)    Housing Stability     Do you have housing? : Yes     Are you worried about losing  your housing?: Yes       FAMILY HISTORY:  History reviewed. No pertinent family history.    REVIEW OF SYSTEMS:    Unremarkable other than chief complaint      PHYSICAL EXAM:   There were no vitals taken for this visit. There is no height or weight on file to calculate BMI.  Patient declines vitals.  Patient declines examination. General Appearance: No acute distress, frustrated. Patient is ambulatory without assistance. Transitions without difficulty.  Stance and gait normal, no antalgia.      IMPRESSION/PLAN:    Per his request I issued him new workability and he will be treating with his chiropractor without prior authorization.  I informed him that with his ongoing symptoms and not yet being treated, I will hold his claim open and he can return as needed.  Further workability/management will be determined by his new treating provider, or he may return to me for this should he get Worker's Compensation approval to proceed with [facility] [place].  2 copies of the new workability were issued to him and he was thankful after the visit.    Total time spent today in chart review/preparation, face to face evaluation, and documentation: 24 minutes.        Rohit Andrade DC, ALLI, BENEDICT  Director - Occupational Medicine Department  Diplomate of the American Board of Forensic Professionals  Board Certified - American Board of Independent Medical Examiners    3:25 PM 1/29/2024

## 2024-02-07 ENCOUNTER — OFFICE VISIT (OUTPATIENT)
Dept: FAMILY MEDICINE | Facility: OTHER | Age: 44
End: 2024-02-07
Payer: MEDICARE

## 2024-02-07 VITALS
HEIGHT: 66 IN | OXYGEN SATURATION: 97 % | TEMPERATURE: 98.4 F | BODY MASS INDEX: 22.98 KG/M2 | SYSTOLIC BLOOD PRESSURE: 135 MMHG | WEIGHT: 143 LBS | HEART RATE: 63 BPM | RESPIRATION RATE: 18 BRPM | DIASTOLIC BLOOD PRESSURE: 89 MMHG

## 2024-02-07 DIAGNOSIS — J10.1 INFLUENZA A: ICD-10-CM

## 2024-02-07 DIAGNOSIS — R05.1 ACUTE COUGH: Primary | ICD-10-CM

## 2024-02-07 DIAGNOSIS — R52 BODY ACHES: ICD-10-CM

## 2024-02-07 LAB
FLUAV RNA SPEC QL NAA+PROBE: POSITIVE
FLUBV RNA RESP QL NAA+PROBE: NEGATIVE
RSV RNA SPEC NAA+PROBE: NEGATIVE
SARS-COV-2 RNA RESP QL NAA+PROBE: NEGATIVE

## 2024-02-07 PROCEDURE — 99213 OFFICE O/P EST LOW 20 MIN: CPT | Performed by: NURSE PRACTITIONER

## 2024-02-07 PROCEDURE — G0463 HOSPITAL OUTPT CLINIC VISIT: HCPCS

## 2024-02-07 PROCEDURE — 87637 SARSCOV2&INF A&B&RSV AMP PRB: CPT | Mod: ZL | Performed by: NURSE PRACTITIONER

## 2024-02-07 RX ORDER — ACETAMINOPHEN 500 MG
500 TABLET ORAL EVERY 4 HOURS PRN
Qty: 30 TABLET | Refills: 0 | Status: SHIPPED | OUTPATIENT
Start: 2024-02-07 | End: 2024-03-19

## 2024-02-07 RX ORDER — BENZONATATE 200 MG/1
200 CAPSULE ORAL 3 TIMES DAILY PRN
Qty: 42 CAPSULE | Refills: 0 | Status: SHIPPED | OUTPATIENT
Start: 2024-02-07 | End: 2024-03-19

## 2024-02-07 RX ORDER — OSELTAMIVIR PHOSPHATE 75 MG/1
75 CAPSULE ORAL 2 TIMES DAILY
Qty: 10 CAPSULE | Refills: 0 | Status: SHIPPED | OUTPATIENT
Start: 2024-02-07 | End: 2024-02-12

## 2024-02-07 RX ORDER — ALBUTEROL SULFATE 90 UG/1
2 AEROSOL, METERED RESPIRATORY (INHALATION) EVERY 4 HOURS PRN
Qty: 18 G | Refills: 0 | Status: SHIPPED | OUTPATIENT
Start: 2024-02-07

## 2024-02-07 RX ORDER — IBUPROFEN 600 MG/1
600 TABLET, FILM COATED ORAL EVERY 6 HOURS PRN
Qty: 30 TABLET | Refills: 0 | Status: SHIPPED | OUTPATIENT
Start: 2024-02-07 | End: 2024-03-19

## 2024-02-07 ASSESSMENT — ENCOUNTER SYMPTOMS
SORE THROAT: 1
HEADACHES: 1
CHILLS: 1
EYES NEGATIVE: 1
COUGH: 1
FEVER: 1
APPETITE CHANGE: 1
WHEEZING: 1
NAUSEA: 1
MYALGIAS: 1
FATIGUE: 1
SHORTNESS OF BREATH: 0
RHINORRHEA: 1
DIARRHEA: 1
ACTIVITY CHANGE: 1
DIAPHORESIS: 1
VOMITING: 1

## 2024-02-07 ASSESSMENT — PAIN SCALES - GENERAL: PAINLEVEL: EXTREME PAIN (9)

## 2024-02-07 NOTE — RESULT ENCOUNTER NOTE
Prescription for Tamiflu sent.    Can you please call patient to let him know that he should not return to work for the remainder of the week, no work Thursday or Friday.  Should not return to work until his symptoms are improving for at least 24 hours without any fever reducing medications.  If he works over the weekend I would recommend he not go to work on Saturday, and may return to work on Sunday as long as symptoms are improving and he has been without fever for at least 24 hours without fever reducing medications.

## 2024-02-07 NOTE — NURSING NOTE
"Chief Complaint   Patient presents with    Headache    Nausea         Initial /89 (BP Location: Right arm, Patient Position: Sitting, Cuff Size: Adult Regular)   Pulse 63   Temp 98.4  F (36.9  C) (Temporal)   Resp 18   Ht 1.676 m (5' 6\")   Wt 64.9 kg (143 lb)   SpO2 97%   BMI 23.08 kg/m   Estimated body mass index is 23.08 kg/m  as calculated from the following:    Height as of this encounter: 1.676 m (5' 6\").    Weight as of this encounter: 64.9 kg (143 lb).       Demetrice Poole     "

## 2024-02-07 NOTE — LETTER
February 7, 2024      Omar Hay  [address] [address]        To Whom It May Concern:    Omar Hay was seen in our clinic for illness.  Patient has influenza A.  Please excuse him from work until Monday, 2/12/2024.                    Sincerely,        KALLI Santos., R.N., APRN CNP

## 2024-02-07 NOTE — PROGRESS NOTES
Omar Hay  1980    ASSESSMENT/PLAN:   1. Acute cough  - Symptomatic Influenza A/B, RSV, & SARS-CoV2 PCR (COVID-19) Nose  - albuterol (PROAIR HFA/PROVENTIL HFA/VENTOLIN HFA) 108 (90 Base) MCG/ACT inhaler; Inhale 2 puffs into the lungs every 4 hours as needed for cough  Dispense: 18 g; Refill: 0  - benzonatate (TESSALON) 200 MG capsule; Take 1 capsule (200 mg) by mouth 3 times daily as needed for cough  Dispense: 42 capsule; Refill: 0    2. Body aches  - acetaminophen (TYLENOL) 500 MG tablet; Take 1 tablet (500 mg) by mouth every 4 hours as needed for mild pain or fever  Dispense: 30 tablet; Refill: 0  - ibuprofen (ADVIL/MOTRIN) 600 MG tablet; Take 1 tablet (600 mg) by mouth every 6 hours as needed for moderate pain or fever  Dispense: 30 tablet; Refill: 0    3. Influenza A  Patient tested positive for influenza A, negative for influenza B, RSV and COVID-19.  Symptomatic care and over-the-counter remedies reviewed with patient for treatment.  Prescription for Tylenol and ibuprofen sent to pharmacy, advised to take as directed.  With patient's persistent cough and wheezing I recommend trialing albuterol inhaler to use as needed for cough, shortness of breath.  Prescription for Tessalon Perles also sent to pharmacy.  I did offer patient antiviral treatment Tamiflu, He is aware that he would need to start this today as it needs to be started within 48 hours of onset of symptoms.  Patient is interested in Tamiflu, prescription was sent to pharmacy for him to begin this evening.  He is aware of most common adverse side effects.  I advised patient not return to work for the remainder of the week due to presenting symptoms and positive influenza a testing.    Patient agrees with plan of care and verbalizes understating. AVS printed. Patient education provided verbally and written instructions provided as requested. Patient made aware of emergent signs and symptoms to monitor for and when to seek additional  "care/follow up.     SUBJECTIVE:   CHIEF COMPLAINT/ REASON FOR VISIT  Patient presents with:  Headache  Nausea     HISTORY OF PRESENT ILLNESS  Omar Hay is a pleasant 43 year old male presents to [facility] today with concerns for upper respiratory symptoms.  Patient states on Monday he developed a sore scratchy throat.  He developed a cough.  He does feel wheezy at times, states he is coughing frequently.  Cough is productive he can feel phlegm moving in his chest.  He has chills, body aches feels nauseated as diarrhea and persistent headache.  He feels fatigued with low energy.  He has a decreased appetite and has not been eating much but is trying to drink fluids.  He has been taking Excedrin Migraine and using Mucinex cough pill.    I have reviewed the nursing notes.  I have reviewed allergies, medication list, problem list, and past medical history.    REVIEW OF SYSTEMS  Review of Systems   Constitutional:  Positive for activity change, appetite change, chills, diaphoresis, fatigue and fever.   HENT:  Positive for congestion, rhinorrhea, sneezing and sore throat. Negative for ear pain.    Eyes: Negative.    Respiratory:  Positive for cough and wheezing. Negative for shortness of breath.    Gastrointestinal:  Positive for diarrhea, nausea and vomiting.   Genitourinary: Negative.    Musculoskeletal:  Positive for myalgias.   Skin: Negative.  Negative for rash.   Neurological:  Positive for headaches.        VITAL SIGNS  Vitals:    02/07/24 1458   BP: 135/89   BP Location: Right arm   Patient Position: Sitting   Cuff Size: Adult Regular   Pulse: 63   Resp: 18   Temp: 98.4  F (36.9  C)   TempSrc: Temporal   SpO2: 97%   Weight: 64.9 kg (143 lb)   Height: 1.676 m (5' 6\")      Body mass index is 23.08 kg/m .    OBJECTIVE:   PHYSICAL EXAM  Physical Exam  Vitals reviewed.   Constitutional:       Appearance: Normal appearance. He is ill-appearing. He is not toxic-appearing or diaphoretic.   HENT:      Head: " Normocephalic and atraumatic.      Right Ear: Tympanic membrane, ear canal and external ear normal.      Left Ear: Tympanic membrane, ear canal and external ear normal.      Nose: Congestion present. No rhinorrhea.      Mouth/Throat:      Pharynx: Posterior oropharyngeal erythema present. No oropharyngeal exudate.   Eyes:      Conjunctiva/sclera: Conjunctivae normal.   Cardiovascular:      Rate and Rhythm: Normal rate and regular rhythm.      Pulses: Normal pulses.      Heart sounds: Normal heart sounds. No murmur heard.  Pulmonary:      Effort: Pulmonary effort is normal.      Breath sounds: Normal breath sounds. No wheezing or rhonchi.   Musculoskeletal:      Cervical back: Neck supple. No tenderness.   Lymphadenopathy:      Cervical: No cervical adenopathy.   Skin:     Findings: No rash.   Neurological:      General: No focal deficit present.      Mental Status: He is alert and oriented to person, place, and time.   Psychiatric:         Mood and Affect: Mood normal.         Behavior: Behavior normal.         Thought Content: Thought content normal.         Judgment: Judgment normal.        DIAGNOSTICS  Results for orders placed or performed in visit on 02/07/24   Symptomatic Influenza A/B, RSV, & SARS-CoV2 PCR (COVID-19) Nose     Status: Abnormal    Specimen: Nose; Swab   Result Value Ref Range    Influenza A PCR Positive (A) Negative    Influenza B PCR Negative Negative    RSV PCR Negative Negative    SARS CoV2 PCR Negative Negative    Narrative    Testing was performed using the Xpert Xpress CoV2/Flu/RSV Assay on the [employer] GeneXpert Instrument. This test should be ordered for the detection of SARS-CoV-2, influenza, and RSV viruses in individuals who meet clinical and/or epidemiological criteria. Test performance is unknown in asymptomatic patients. This test is for in vitro diagnostic use under the FDA EUA for laboratories certified under CLIA to perform high or moderate complexity testing. This test has not  been FDA cleared or approved. A negative result does not rule out the presence of PCR inhibitors in the specimen or target RNA in concentration below the limit of detection for the assay. If only one viral target is positive but coinfection with multiple targets is suspected, the sample should be re-tested with another FDA cleared, approved, or authorized test, if coinfection would change clinical management. This test was validated by the [facility]. These laboratories are certified under the Clinical Laboratory Improvement Amendments of 1988 (CLIA-88) as qualified to perform high complexity laboratory testing.        Libra Lucas NP  [facility] & [facility]

## 2024-03-18 ENCOUNTER — OFFICE VISIT (OUTPATIENT)
Dept: FAMILY MEDICINE | Facility: OTHER | Age: 44
End: 2024-03-18
Attending: FAMILY MEDICINE
Payer: OTHER MISCELLANEOUS

## 2024-03-18 VITALS
WEIGHT: 148.2 LBS | HEART RATE: 71 BPM | TEMPERATURE: 98.6 F | OXYGEN SATURATION: 97 % | DIASTOLIC BLOOD PRESSURE: 60 MMHG | BODY MASS INDEX: 23.92 KG/M2 | SYSTOLIC BLOOD PRESSURE: 110 MMHG | RESPIRATION RATE: 20 BRPM

## 2024-03-18 DIAGNOSIS — M99.05 SEGMENTAL DYSFUNCTION OF PELVIC REGION: ICD-10-CM
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
## 2024-03-18 PROCEDURE — 99214 OFFICE O/P EST MOD 30 MIN: CPT | Performed by: FAMILY MEDICINE

## 2024-03-18 RX ORDER — MELOXICAM 7.5 MG/1
TABLET ORAL
Qty: 60 TABLET | Refills: 4 | Status: SHIPPED | OUTPATIENT
Start: 2024-03-18 | End: 2024-04-11

## 2024-03-18 ASSESSMENT — PATIENT HEALTH QUESTIONNAIRE - PHQ9
SUM OF ALL RESPONSES TO PHQ QUESTIONS 1-9: 19
10. IF YOU CHECKED OFF ANY PROBLEMS, HOW DIFFICULT HAVE THESE PROBLEMS MADE IT FOR YOU TO DO YOUR WORK, TAKE CARE OF THINGS AT HOME, OR GET ALONG WITH OTHER PEOPLE: EXTREMELY DIFFICULT
SUM OF ALL RESPONSES TO PHQ QUESTIONS 1-9: 19

## 2024-03-18 ASSESSMENT — PAIN SCALES - GENERAL: PAINLEVEL: SEVERE PAIN (6)

## 2024-03-18 NOTE — NURSING NOTE
Patient here for work comp he fell down his steps at home because they are so narrow. This occurred 3 weeks prior. The initial work comp was dated 01/05/2024. He complains of pain and not being able to sleep in his own bed. Medication Reconciliation: complete.    Vivian Last, HEIDE  3/18/2024 8:00 AM

## 2024-03-19 NOTE — PROGRESS NOTES
SUBJECTIVE:   Omar Hay is a 43 year old male who presents to clinic today for the following health issues:  Follow-up back pain  Patient arrives here for follow-up back pain.  He was initially seen in January after stepping on a curb at work.  He works at [employer].  He slipped on the ice.  He reports small more recently fell down a flight of stairs at home 3 weeks ago.  He is slipped off the stair and slid down about 24 stairs.  He has had increasing pain.  He has been on ibuprofen and Flexeril in the past.  Denies any changes in bowel or bladder habits.  Although stooling is become a little more difficult.  No weakness in the legs.  Initially he was seen by work comp.   his initial injury has been submitted.    History of Present Illness       Back Pain:  He presents for follow up of back pain. Patient's back pain is a chronic problem.  Location of back pain:  Right lower back, left lower back, right side of waist and left side of waist  Description of back pain: cramping and dull ache  Back pain spreads: right buttocks, left buttocks, right thigh and left thigh    Since patient first noticed back pain, pain is: unchanged  Does back pain interfere with his job:  Yes       He eats 2-3 servings of fruits and vegetables daily.He consumes 8 sweetened beverage(s) daily.He exercises with enough effort to increase his heart rate 9 or less minutes per day.  He exercises with enough effort to increase his heart rate 3 or less days per week.   He is taking medications regularly.        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   Left pupil > right pupil       No past medical history on file.   No past surgical history on file.    Review of Systems     OBJECTIVE:     /60   Pulse 71   Temp 98.6  F (37  C)   Resp 20   Wt 67.2 kg (148 lb 3.2 oz)   SpO2 97%   BMI 23.92 kg/m    Body mass index is 23.92 kg/m .  Physical Exam  Constitutional:       Appearance: Normal appearance.   Genitourinary:     Rectum: Normal.      Comments: Rectal tone was normal  Musculoskeletal:      Comments: Able to walk on toes and heels.  Patient does have pain along the SI joint bilateral.  No pain on palpation over the spinous processes   Neurological:      Mental Status: He is alert.      Gait: Gait normal.      Deep Tendon Reflexes: Reflexes normal.         Diagnostic Test Results:  none     ASSESSMENT/PLAN:         (Y99.0) Work related injury  (primary encounter diagnosis)  Comment:   Plan: meloxicam (MOBIC) 7.5 MG tablet            (M99.05) Segmental dysfunction of pelvic region  Comment: Will start Mobic.  Advise increasing to 15 if tolerating the 7.5 well.  Plan: meloxicam (MOBIC) 7.5 MG tablet          Follow-up with work comp    Carlos Ozuna MD  [facility]

## 2024-03-28 ENCOUNTER — APPOINTMENT (OUTPATIENT)
Dept: FAMILY MEDICINE | Facility: OTHER | Age: 44
End: 2024-03-28
Attending: FAMILY MEDICINE
Payer: OTHER MISCELLANEOUS

## 2024-03-28 ASSESSMENT — PATIENT HEALTH QUESTIONNAIRE - PHQ9
SUM OF ALL RESPONSES TO PHQ QUESTIONS 1-9: 13
10. IF YOU CHECKED OFF ANY PROBLEMS, HOW DIFFICULT HAVE THESE PROBLEMS MADE IT FOR YOU TO DO YOUR WORK, TAKE CARE OF THINGS AT HOME, OR GET ALONG WITH OTHER PEOPLE: VERY DIFFICULT
SUM OF ALL RESPONSES TO PHQ QUESTIONS 1-9: 13

## 2024-04-03 ENCOUNTER — OFFICE VISIT (OUTPATIENT)
Dept: FAMILY MEDICINE | Facility: OTHER | Age: 44
End: 2024-04-03
Attending: CHIROPRACTOR
Payer: OTHER MISCELLANEOUS

## 2024-04-03 VITALS
RESPIRATION RATE: 20 BRPM | TEMPERATURE: 97.8 F | WEIGHT: 151.8 LBS | SYSTOLIC BLOOD PRESSURE: 112 MMHG | BODY MASS INDEX: 24.4 KG/M2 | OXYGEN SATURATION: 98 % | DIASTOLIC BLOOD PRESSURE: 60 MMHG | HEIGHT: 66 IN | HEART RATE: 57 BPM

## 2024-04-03 DIAGNOSIS — M99.05 SEGMENTAL DYSFUNCTION OF PELVIC REGION: ICD-10-CM

## 2024-04-03 DIAGNOSIS — M46.1 SACROILIITIS (H): ICD-10-CM
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
## 2024-04-03 PROCEDURE — 99213 OFFICE O/P EST LOW 20 MIN: CPT | Performed by: CHIROPRACTOR

## 2024-04-03 ASSESSMENT — PAIN SCALES - GENERAL: PAINLEVEL: EXTREME PAIN (8)

## 2024-04-03 NOTE — PROGRESS NOTES
CHIEF COMPLAINT:   Chief Complaint   Patient presents with    Work Comp     Follow up back        HISTORY OF PRESENTING WORK INJURY     Omar returns today for evaluation of ongoing right low back pain.  He continues to attribute this to his work-related injury back in January.  He did request full work duties and closure case but this apparently is still open.  He did fall down a flight of 15-20 steps in February.  Prior to this, he was treating with his chiropractor with no significant changes in his condition.  He denies any radicular symptoms down the leg and points to the right SI joint and mid right L5 region.  He has been working without restrictions and wants to remain with this.  He denies any new symptoms of cauda equina or sensory deficits in the legs.  I do not have any of his prior treatment records to review.    Oswestry (MANDA) Questionnaire        4/3/2024    10:31 AM   OSWESTRY DISABILITY INDEX   Count 9   Sum 19   Oswestry Score (%) 42.22 %        PAST MEDICAL HISTORY:  No past medical history on file.    PAST SURGICAL HISTORY:  No past surgical history on file.    ALLERGIES:  Allergies   Allergen Reactions    Penicillins Hives and Rash    Ketorolac Hives    Tramadol Hives and Nausea and Vomiting     rash       CURRENT MEDICATIONS:  Current Outpatient Medications   Medication Sig Dispense Refill    albuterol (PROAIR HFA/PROVENTIL HFA/VENTOLIN HFA) 108 (90 Base) MCG/ACT inhaler Inhale 2 puffs into the lungs every 4 hours as needed for cough 18 g 0    dibucaine 1 % OINT rectal ointment Place rectally 3 times daily as needed for hemorrhoids 60 g 1    medical cannabis (Patient's own supply) See Admin Instructions (The purpose of this order is to document that the patient reports taking medical cannabis.  This is not a prescription, and is not used to certify that the patient has a qualifying medical condition.)      meloxicam (MOBIC) 7.5 MG tablet Take one to two tablets q day (Patient not taking:  Reported on 4/3/2024) 60 tablet 4       SOCIAL HISTORY:  Social History     Socioeconomic History    Marital status: Single     Spouse name: Not on file    Number of children: Not on file    Years of education: Not on file    Highest education level: Not on file   Occupational History    Not on file   Tobacco Use    Smoking status: Former     Packs/day: .25     Types: Cigarettes    Smokeless tobacco: Former   Vaping Use    Vaping Use: Never used   Substance and Sexual Activity    Alcohol use: Not Currently     Comment: quite drinking 15 years ago    Drug use: Yes     Types: Methamphetamines, Marijuana     Comment: former meth user 3 years clean/ medical cannabis    Sexual activity: Yes     Partners: Female   Other Topics Concern    Not on file   Social History Narrative    Not on file     Social Determinants of Health     Financial Resource Strain: Low Risk  (3/18/2024)    Financial Resource Strain     Within the past 12 months, have you or your family members you live with been unable to get utilities (heat, electricity) when it was really needed?: No   Food Insecurity: Low Risk  (3/18/2024)    Food Insecurity     Within the past 12 months, did you worry that your food would run out before you got money to buy more?: No     Within the past 12 months, did the food you bought just not last and you didn t have money to get more?: No   Transportation Needs: Low Risk  (3/18/2024)    Transportation Needs     Within the past 12 months, has lack of transportation kept you from medical appointments, getting your medicines, non-medical meetings or appointments, work, or from getting things that you need?: No   Physical Activity: Not on file   Stress: Not on file   Social Connections: Not on file   Interpersonal Safety: Low Risk  (4/3/2024)    Interpersonal Safety     Do you feel physically and emotionally safe where you currently live?: Yes     Within the past 12 months, have you been hit, slapped, kicked or otherwise  "physically hurt by someone?: No     Within the past 12 months, have you been humiliated or emotionally abused in other ways by your partner or ex-partner?: No   Housing Stability: Low Risk  (3/18/2024)    Housing Stability     Do you have housing? : Yes     Are you worried about losing your housing?: No       FAMILY HISTORY:  No family history on file.    REVIEW OF SYSTEMS:    Unremarkable other than chief complaint      PHYSICAL EXAM:   /60   Pulse 57   Temp 97.8  F (36.6  C)   Resp 20   Ht 1.676 m (5' 6\")   Wt 68.9 kg (151 lb 12.8 oz)   SpO2 98%   BMI 24.50 kg/m   Body mass index is 24.5 kg/m .    General Appearance: Pleasant, alert, appropriate appearance for age. No acute distress. Patient is ambulatory without assistance. Transitions without difficulty.  Stance and gait normal, no antalgia.  He has right SI joint pain with SLR testing, Ely's test on the right, and Nachlas test on the right.  I did not palpate any significant spasm in the lumbar spine.  Review of prior x-rays demonstrate sacral subluxation posteriorly in relation to L5.      IMPRESSION/PLAN:    He may have a bulging disc at the L5-S1 area but most of his pain is occurring at the right sacroiliac joint.  I am  unable to review exactly what treatment he was receiving prior to returning to me.  Nonetheless I suggested new plan of combination chiropractic and physical therapy.  Both referrals placed today.  Should he not have resolution of his condition based on conservative treatment, we will entertain the idea of further MRI imaging.  Without radicular symptoms today, I would like to start with conservative treatment.  He is agreeable to this and also was issued a full work release at his request.  I will have him return for reevaluation and workability update in approximately 1 month.      Total time spent today in chart review/preparation, face to face evaluation, and documentation: 34 minutes.        Rohit Andrade DC, DABFP, " BENEDICT  Director - Occupational Medicine Department  Diplomate of the American Board of Forensic Professionals  Board Certified - American Board of Independent Medical Examiners    11:05 AM 4/3/2024

## 2024-04-03 NOTE — NURSING NOTE
Patient here for work comp follow up for back injury. Medication Reconciliation: complete.    Vivian Last LPN  4/3/2024 10:39 AM

## 2024-04-11 ENCOUNTER — HOSPITAL ENCOUNTER (OUTPATIENT)
Dept: GENERAL RADIOLOGY | Facility: OTHER | Age: 44
Discharge: HOME | End: 2024-04-11
Attending: FAMILY MEDICINE
Payer: OTHER MISCELLANEOUS

## 2024-04-11 ENCOUNTER — OFFICE VISIT (OUTPATIENT)
Dept: FAMILY MEDICINE | Facility: OTHER | Age: 44
End: 2024-04-11
Attending: FAMILY MEDICINE
Payer: OTHER MISCELLANEOUS

## 2024-04-11 VITALS
SYSTOLIC BLOOD PRESSURE: 122 MMHG | BODY MASS INDEX: 23.18 KG/M2 | DIASTOLIC BLOOD PRESSURE: 66 MMHG | TEMPERATURE: 97.8 F | HEART RATE: 66 BPM | RESPIRATION RATE: 20 BRPM | WEIGHT: 148 LBS | OXYGEN SATURATION: 97 %

## 2024-04-11 DIAGNOSIS — M25.551 HIP PAIN, RIGHT: Primary | ICD-10-CM

## 2024-04-11 DIAGNOSIS — M25.551 HIP PAIN, RIGHT: ICD-10-CM

## 2024-04-11 PROCEDURE — 99214 OFFICE O/P EST MOD 30 MIN: CPT | Performed by: FAMILY MEDICINE

## 2024-04-11 PROCEDURE — 73502 X-RAY EXAM HIP UNI 2-3 VIEWS: CPT

## 2024-04-11 RX ORDER — DULOXETIN HYDROCHLORIDE 20 MG/1
20 CAPSULE, DELAYED RELEASE ORAL 2 TIMES DAILY
Qty: 30 CAPSULE | Refills: 4 | Status: SHIPPED | OUTPATIENT
Start: 2024-04-11

## 2024-04-11 ASSESSMENT — PAIN SCALES - GENERAL: PAINLEVEL: SEVERE PAIN (7)

## 2024-04-11 NOTE — NURSING NOTE
Patient here for work comp visit for pain medication for right hip joint pain. This is a new area of concern with pain. Medication Reconciliation: complete.    Vivian Last LPN  4/11/2024 9:04 AM

## 2024-04-11 NOTE — PROGRESS NOTES
SUBJECTIVE:   Omar Hay is a 43 year old male who presents to clinic today for the following health issues: Medication adjustment    Patient arrives here for medication adjustment.  He is being seen at [employer] for left hip pain but recently states he is fell a couple times.  1 time down the stairs.  On the other time more recently fell out of bed couple days ago.  Landing on his right hip.  He reports that he has chronic right hip pain but this has worsened since his fall.  Has been on Mobic without any improvement.  Walking is difficult.    History of Present Illness       Back Pain:  He presents for follow up of back pain. Patient's back pain is a recurring problem.  Location of back pain:  Right lower back, left lower back, right hip and right side of waist  Description of back pain: dull ache  Back pain spreads: right buttocks and right thigh    Since patient first noticed back pain, pain is: gradually worsening  Does back pain interfere with his job:  No       He eats 2-3 servings of fruits and vegetables daily.He consumes 6 sweetened beverage(s) daily.He exercises with enough effort to increase his heart rate 10 to 19 minutes per day.  He exercises with enough effort to increase his heart rate 7 days per week.   He is taking medications regularly.        Patient Active Problem List    Diagnosis Date Noted     Methamphetamine abuse in remission (H) 08/17/2023     Priority: Medium     Other cerebral palsy (H) 08/17/2023     Priority: Medium     Finger amputation, no complication 08/17/2023     Priority: Medium     PTSD (post-traumatic stress disorder) 08/17/2023     Priority: Medium     Medical cannabis use 08/17/2023     Priority: Medium     Anisocoria 10/19/2017     Priority: Medium     Formatting of this note might be different from the original.   Left pupil > right pupil       No past medical history on file.     Review of Systems     OBJECTIVE:     /66   Pulse 66   Temp 97.8  F  (36.6  C)   Resp 20   Wt 67.1 kg (148 lb)   SpO2 97%   BMI 23.18 kg/m    Body mass index is 23.18 kg/m .  Physical Exam  Constitutional:       Appearance: Normal appearance.   HENT:      Head: Normocephalic.   Musculoskeletal:      Comments: Slight shuffle in his gait   Neurological:      Mental Status: He is alert.   Psychiatric:         Mood and Affect: Mood normal.         Diagnostic Test Results:  none     ASSESSMENT/PLAN:         (M25.351) Hip pain, right  (primary encounter diagnosis)  Comment: X-rays were negative for fracture.  Plan: XR Hip Right 2-3 Views, DULoxetine (CYMBALTA)         20 MG capsule  Currently the Mobic is not helping.  We discussed using Cymbalta or another nonsteroidal.  He wishes to pursue Cymbalta.  He will call in 3 to 4 weeks to increase the dose if he does not get good relief.      Carlos Ozuna MD  [facility] AND [facility]

## 2024-04-29 ENCOUNTER — THERAPY VISIT (OUTPATIENT)
Dept: PHYSICAL THERAPY | Facility: OTHER | Age: 44
End: 2024-04-29
Attending: CHIROPRACTOR
Payer: OTHER MISCELLANEOUS

## 2024-04-29 DIAGNOSIS — M99.05 SOMATIC DYSFUNCTION OF PELVIS REGION: ICD-10-CM

## 2024-04-29 DIAGNOSIS — M46.1 SACROILIITIS (H): Primary | ICD-10-CM
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
## 2024-04-29 PROCEDURE — 97110 THERAPEUTIC EXERCISES: CPT | Mod: GP

## 2024-04-29 PROCEDURE — 97162 PT EVAL MOD COMPLEX 30 MIN: CPT | Mod: GP

## 2024-04-30 PROBLEM — M99.05 SOMATIC DYSFUNCTION OF PELVIS REGION: Status: ACTIVE | Noted: 2024-04-30

## 2024-04-30 PROBLEM — M46.1 SACROILIITIS (H): Status: ACTIVE | Noted: 2024-04-30
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
## 2024-04-30 NOTE — PROGRESS NOTES
PHYSICAL THERAPY EVALUATION  Type of Visit: Evaluation    See electronic medical record for Abuse and Falls Screening details.    Subjective       Presenting condition or subjective complaint:  Roque slipped on ice at work on 1/5/24 and went down hard on his buttock/ L hip. Went to the emergency department with severe pain over bilateral hips and midline lumbar spine, some numbness and paresthesias in his left lower extremity. X-rays were negative for fracture. He has had continued to have significant pain since in bilatteral low back and pelvis. Not getting any better. Works at [employer] 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 The pain affects everything he does, sleep, sex life, work performance, nothing helps. Constantly adjust and move postions. Now he is having more pain in R low back than his left side although that still gets bad too. Pain level at best 6/10 and gets up to 10/10. He's tried IBP and perscription meds nothing helps, can't use muscle relaxors because they just knock him out and wouldn't be able work. He does have cerebral palsy with only deficits effecting his left upper extremity without being able to fully extend fingers/wrist/shoulder and history of surgeries at ages 14 and 15 to correct pigeon toe gait. He has not had any history of back issues prior to fall at work in January.   Date of onset: 01/05/24    Prior diagnostic imaging/testing results:     x-rays negative for fracture  Prior therapy history for the same diagnosis, illness or injury:    chiropractic care did not give him any relief    Prior Level of Function: Independent     Objective   LUMBAR SPINE EVALUATION  PAIN: pain 6-10/10, aching, constant, worse with activity  POSTURE: slouching, not comfortable  to sit up straight  GAIT: WFL  ROM: trunk flexion limited to just touching his knees, extension moderate limitation, side bending right moderate limitation and side bending left maximum limitation- all reproducing low back pain worse on right than L  PELVIC/SI SCREEN: not painful palpation over SI   STRENGTH: hip flex R 4/5 with pain L 5-/5, hip abd, add, knee flex, ext, R DF all 5-/5, L DF 3/5 ankle limitation not new   NEURAL TENSION: slump test L negative, R positive  PALPATION: pain in R piriformis and paraesthesia reproduced with deep pressure    Assessment & Plan   CLINICAL IMPRESSIONS  Medical Diagnosis: Y99.0 (ICD-10-CM) - Work related injury  M99.05 (ICD-10-CM) - Segmental dysfunction of pelvic region  M46.1 (ICD-10-CM) - Sacroiliitis (H24)    Treatment Diagnosis: piriformis syndrome, lumbar radiculopathy   Impression/Assessment: Patient is a 43 year old male with complaints of B low back/buttock pain worse on the R.  The following significant findings have been identified: Pain, Decreased ROM/flexibility, Decreased strength, Impaired muscle performance, Decreased activity tolerance, and Impaired posture. These impairments interfere with their ability to perform self care tasks, work tasks, recreational activities, household chores, driving , household mobility, and community mobility as compared to previous level of function.     Clinical Decision Making (Complexity):  Clinical Presentation: Evolving/Changing  Clinical Presentation Rationale: based on medical and personal factors listed in PT evaluation  Clinical Decision Making (Complexity): Moderate complexity    PLAN OF CARE  Treatment Interventions:  Modalities: Cryotherapy, Hot Pack, Ultrasound  Interventions: Manual Therapy, Neuromuscular Re-education, Therapeutic Exercise    Long Term Goals     PT Goal 1  Goal Identifier: standing  Goal Description: Pt able to tolerate standing for 2 hours or more at a time before needing to sit for short break for  all work duties within 8 weeks.  Target Date: 06/24/24  PT Goal 2  Goal Identifier: sleep  Goal Description: Pt able to be able to sleep for 4 hours before having to move in the night to reposition so he is getting restful sleep for all aspects of his health within 8 weeks.  Target Date: 06/24/24  PT Goal 3  Goal Identifier: HEP  Goal Description: Pt following through and independent with further progression of home exercise program to manage any residual back pain within 8 weeks.  Target Date: 06/24/24      Frequency of Treatment: 2x/wk  Duration of Treatment: 8 weeks    Education Assessment:   Learner/Method: Patient  Education Comments: Trovix code: [id number]    Risks and benefits of evaluation/treatment have been explained.   Patient/Family/caregiver agrees with Plan of Care.     Evaluation Time:     PT Eval, Moderate Complexity Minutes (30195): 30    Signing Clinician: Thea Pina DPT

## 2024-04-30 NOTE — PROGRESS NOTES
04/29/24 1645   Oswestry Disability Index (MANDA   Jordan Gomes 1980 Version 2.1a, All rights reserved)   Section 1 - Pain intensity 3   Section 2 - Personal care (washing, dressing, etc.)  2   Section 3 - Lifting 2   Section 4 - Walking 2   Section 5 - Sitting 3   Section 6 - Standing 3   Section 7 - Sleeping 3   Section 8 - Sex life (if applicable) 4   Section 9 - Social life 1   Section 10 - Traveling 4   Sum 27   Count 10   Oswestry Score (%) 54 %     Notes:   Standing- his pain gets worse but he pushes through standing any length of time because it necessary for work  Sleeping- hard to know total hours he is sleeping, he is up every hour and tossing/turning a lot.

## 2024-05-02 ENCOUNTER — OFFICE VISIT (OUTPATIENT)
Dept: FAMILY MEDICINE | Facility: OTHER | Age: 44
End: 2024-05-02
Attending: NURSE PRACTITIONER
Payer: COMMERCIAL

## 2024-05-02 VITALS
HEIGHT: 67 IN | TEMPERATURE: 97.5 F | SYSTOLIC BLOOD PRESSURE: 124 MMHG | DIASTOLIC BLOOD PRESSURE: 76 MMHG | OXYGEN SATURATION: 97 % | HEART RATE: 58 BPM | WEIGHT: 143.8 LBS | RESPIRATION RATE: 16 BRPM | BODY MASS INDEX: 22.57 KG/M2

## 2024-05-02 DIAGNOSIS — J11.1 INFLUENZA-LIKE ILLNESS: Primary | ICD-10-CM

## 2024-05-02 DIAGNOSIS — J45.21 MILD INTERMITTENT ASTHMA WITH ACUTE EXACERBATION: ICD-10-CM

## 2024-05-02 LAB
FLUAV RNA SPEC QL NAA+PROBE: NEGATIVE
FLUBV RNA RESP QL NAA+PROBE: NEGATIVE
RSV RNA SPEC NAA+PROBE: NEGATIVE
SARS-COV-2 RNA RESP QL NAA+PROBE: NEGATIVE

## 2024-05-02 PROCEDURE — 87637 SARSCOV2&INF A&B&RSV AMP PRB: CPT | Mod: ZL | Performed by: NURSE PRACTITIONER

## 2024-05-02 PROCEDURE — 99214 OFFICE O/P EST MOD 30 MIN: CPT | Performed by: NURSE PRACTITIONER

## 2024-05-02 PROCEDURE — G0463 HOSPITAL OUTPT CLINIC VISIT: HCPCS

## 2024-05-02 RX ORDER — PREDNISONE 20 MG/1
40 TABLET ORAL DAILY
Qty: 10 TABLET | Refills: 0 | Status: SHIPPED | OUTPATIENT
Start: 2024-05-02 | End: 2024-05-07

## 2024-05-02 ASSESSMENT — PAIN SCALES - GENERAL: PAINLEVEL: SEVERE PAIN (6)

## 2024-05-02 NOTE — LETTER
May 2, 2024      Omar Hay  [address] [place] [place] [address]        To Whom It May Concern:    Omar Hay  was seen on 5/2/24 for likely influenza.  Please excuse him from work 5/3/24.        Sincerely,        Elicia Perry NP

## 2024-05-02 NOTE — NURSING NOTE
"Chief Complaint   Patient presents with    Flu       Initial /76 (BP Location: Right arm, Patient Position: Sitting, Cuff Size: Adult Large)   Pulse 58   Temp 97.5  F (36.4  C) (Temporal)   Resp 16   Ht 1.702 m (5' 7\")   Wt 65.2 kg (143 lb 12.8 oz)   SpO2 97%   BMI 22.52 kg/m   Estimated body mass index is 22.52 kg/m  as calculated from the following:    Height as of this encounter: 1.702 m (5' 7\").    Weight as of this encounter: 65.2 kg (143 lb 12.8 oz).  Medication Review: complete    The next two questions are to help us understand your food security.  If you are feeling you need any assistance in this area, we have resources available to support you today.          3/18/2024   SDOH- Food Insecurity   Within the past 12 months, did you worry that your food would run out before you got money to buy more? N   Within the past 12 months, did the food you bought just not last and you didn t have money to get more? N         Health Care Directive:  Patient does not have a Health Care Directive or Living Will: Discussed advance care planning with patient; however, patient declined at this time.    Aurelio Cedeno      "

## 2024-05-02 NOTE — PROGRESS NOTES
ASSESSMENT/PLAN:     I have reviewed the nursing notes.  I have reviewed the findings, diagnosis, plan and need for follow up with the patient.        1. Influenza-like illness    - Symptomatic Influenza A/B, RSV, & SARS-CoV2 PCR (COVID-19) Nose    Negative viral PCR testing including RSV, Covid, Influenza A, Influenza B    Discussed with patient that symptoms and exam are consistent with viral illness.    No clinical indications for antibiotic treatment at this time.    Symptomatic treatment - Encouraged fluids, salt water gargles, honey, elevation, humidifier, saline nasal spray, sinus rinse/netti pot, lozenges, tea, soup, smoothies, popsicles, topical vapor rub, rest, etc   May use over the counter cough/cold medication PRN  May use over-the-counter Tylenol or ibuprofen PRN    Discussed warning signs/symptoms indicative of need to f/u  Follow up if symptoms persist or worsen or concerns    2. Mild intermittent asthma with acute exacerbation    - Symptomatic Influenza A/B, RSV, & SARS-CoV2 PCR (COVID-19) Nose  - predniSONE (DELTASONE) 20 MG tablet; Take 2 tablets (40 mg) by mouth daily for 5 days  Dispense: 10 tablet; Refill: 0    Acute asthma exacerbation secondary to viral illness.  Continue Albuterol inhaler.  Add oral steroid - Prednisone  Follow up if worsening or concerns          I explained my diagnostic considerations and recommendations to the patient, who voiced understanding and agreement with the treatment plan. All questions were answered. We discussed potential side effects of any prescribed or recommended therapies, as well as expectations for response to treatments.    Elicia Perry NP  [facility] AND [facility]      SUBJECTIVE:   Omar Hay is a 43 year old male who presents to clinic today for the following health issues:  Influenza      HPI  Sick the past 48 hours with cough, chest tightness, wheezing, burning in chest, shortness of breath, sore throat, headaches, body  "aches, fatigue, nausea, nasal congestion,ears plugged ,and hot/cold flashes.  No fevers.  Recent exposure to influenza.  Appetite has been low, no intake today.    Taking Mucinex.  Using Albuterol inhaler without relief.  States he only uses in inhaler for asthma flares during illness.        History reviewed. No pertinent past medical history.  History reviewed. No pertinent surgical history.  Social History     Tobacco Use    Smoking status: Some Days     Current packs/day: 0.25     Types: Cigarettes    Smokeless tobacco: Former   Substance Use Topics    Alcohol use: Not Currently     Comment: quite drinking 15 years ago     Current Outpatient Medications   Medication Sig Dispense Refill    albuterol (PROAIR HFA/PROVENTIL HFA/VENTOLIN HFA) 108 (90 Base) MCG/ACT inhaler Inhale 2 puffs into the lungs every 4 hours as needed for cough 18 g 0    dibucaine 1 % OINT rectal ointment Place rectally 3 times daily as needed for hemorrhoids 60 g 1    DULoxetine (CYMBALTA) 20 MG capsule Take 1 capsule (20 mg) by mouth 2 times daily 30 capsule 4    medical cannabis (Patient's own supply) See Admin Instructions (The purpose of this order is to document that the patient reports taking medical cannabis.  This is not a prescription, and is not used to certify that the patient has a qualifying medical condition.)       Allergies   Allergen Reactions    Penicillins Hives and Rash    Ketorolac Hives    Tramadol Hives and Nausea and Vomiting     rash         Past medical history, past surgical history, current medications and allergies reviewed and accurate to the best of my knowledge.        OBJECTIVE:     /76 (BP Location: Right arm, Patient Position: Sitting, Cuff Size: Adult Large)   Pulse 58   Temp 97.5  F (36.4  C) (Temporal)   Resp 16   Ht 1.702 m (5' 7\")   Wt 65.2 kg (143 lb 12.8 oz)   SpO2 97%   BMI 22.52 kg/m    Body mass index is 22.52 kg/m .        Physical Exam  General Appearance: nontoxic appearing adult " male, appropriate appearance for age. No acute distress  Ears: Left TM intact, no erythema, no effusion, no bulging, no purulence.  Right TM intact, no erythema, no effusion, no bulging, no purulence.  Left auditory canal clear without drainage or bleeding.  Right auditory canal clear without drainage or bleeding.  Normal external ears, non tender.  Eyes: conjunctivae normal without erythema or irritation, corneas clear, no drainage or crusting, no eyelid swelling, pupils equal   Orophayrnx: moist mucous membranes, pharynx without erythema, tonsils without hypertrophy, tonsils without erythema, no tonsillar exudates, no oral lesions, no palate petechiae, no post nasal drip seen, no trismus, voice clear.    Nose:  Nares dry without noted drainage or congestion   Neck: supple without adenopathy  Respiratory: normal chest wall and respirations.  Normal effort.  Diffuse course wheezing and rhonchi to auscultation bilaterally.    Frequent course congested cough appreciated.  Cardiac: RRR with no murmurs  Musculoskeletal:  Equal movement of bilateral upper extremities.  Equal movement of bilateral lower extremities.  Normal gait.    Psychological: normal affect, alert, oriented, and pleasant.       Labs:  Results for orders placed or performed in visit on 05/02/24   Symptomatic Influenza A/B, RSV, & SARS-CoV2 PCR (COVID-19) Nose     Status: Normal    Specimen: Nose; Swab   Result Value Ref Range    Influenza A PCR Negative Negative    Influenza B PCR Negative Negative    RSV PCR Negative Negative    SARS CoV2 PCR Negative Negative    Narrative    Testing was performed using the Xpert Xpress CoV2/Flu/RSV Assay on the [employer] GeneXpert Instrument. This test should be ordered for the detection of SARS-CoV-2, influenza, and RSV viruses in individuals who meet clinical and/or epidemiological criteria. Test performance is unknown in asymptomatic patients. This test is for in vitro diagnostic use under the FDA EUA for  laboratories certified under CLIA to perform high or moderate complexity testing. This test has not been FDA cleared or approved. A negative result does not rule out the presence of PCR inhibitors in the specimen or target RNA in concentration below the limit of detection for the assay. If only one viral target is positive but coinfection with multiple targets is suspected, the sample should be re-tested with another FDA cleared, approved, or authorized test, if coinfection would change clinical management. This test was validated by the [facility]. These laboratories are certified under the Clinical Laboratory Improvement Amendments of 1988 (CLIA-88) as qualified to perform high complexity laboratory testing.

## 2024-05-02 NOTE — LETTER
May 2, 2024      Omar Hay  [address] [facility] [address]        To Whom It May Concern:    Omar Hay  was seen on 5/2/24 for likely influenza.  Please excuse him today due to illness.      Sincerely,        Elicia Perry, NP

## 2024-05-06 ENCOUNTER — HOSPITAL ENCOUNTER (EMERGENCY)
Facility: OTHER | Age: 44
Discharge: HOME | End: 2024-05-06
Attending: PHYSICIAN ASSISTANT | Admitting: PHYSICIAN ASSISTANT
Payer: COMMERCIAL

## 2024-05-06 ENCOUNTER — APPOINTMENT (OUTPATIENT)
Dept: GENERAL RADIOLOGY | Facility: OTHER | Age: 44
End: 2024-05-06
Attending: PHYSICIAN ASSISTANT
Payer: COMMERCIAL

## 2024-05-06 VITALS
TEMPERATURE: 98.1 F | DIASTOLIC BLOOD PRESSURE: 80 MMHG | BODY MASS INDEX: 22.57 KG/M2 | OXYGEN SATURATION: 96 % | HEIGHT: 67 IN | SYSTOLIC BLOOD PRESSURE: 132 MMHG | RESPIRATION RATE: 20 BRPM | HEART RATE: 65 BPM | WEIGHT: 143.8 LBS

## 2024-05-06 DIAGNOSIS — J40 BRONCHITIS: ICD-10-CM

## 2024-05-06 LAB
ANION GAP SERPL CALCULATED.3IONS-SCNC: 11 MMOL/L (ref 7–15)
BASOPHILS # BLD AUTO: 0.1 10E3/UL (ref 0–0.2)
BASOPHILS NFR BLD AUTO: 1 %
BUN SERPL-MCNC: 19.6 MG/DL (ref 6–20)
CALCIUM SERPL-MCNC: 9 MG/DL (ref 8.6–10)
CHLORIDE SERPL-SCNC: 100 MMOL/L (ref 98–107)
CREAT SERPL-MCNC: 0.82 MG/DL (ref 0.67–1.17)
DEPRECATED HCO3 PLAS-SCNC: 26 MMOL/L (ref 22–29)
EGFRCR SERPLBLD CKD-EPI 2021: >90 ML/MIN/1.73M2
EOSINOPHIL # BLD AUTO: 0.3 10E3/UL (ref 0–0.7)
EOSINOPHIL NFR BLD AUTO: 4 %
ERYTHROCYTE [DISTWIDTH] IN BLOOD BY AUTOMATED COUNT: 12.3 % (ref 10–15)
FLUAV RNA SPEC QL NAA+PROBE: NEGATIVE
FLUBV RNA RESP QL NAA+PROBE: NEGATIVE
GLUCOSE SERPL-MCNC: 130 MG/DL (ref 70–99)
HCT VFR BLD AUTO: 39.7 % (ref 40–53)
HGB BLD-MCNC: 13.9 G/DL (ref 13.3–17.7)
HOLD SPECIMEN: NORMAL
IMM GRANULOCYTES # BLD: 0 10E3/UL
IMM GRANULOCYTES NFR BLD: 0 %
LYMPHOCYTES # BLD AUTO: 3.3 10E3/UL (ref 0.8–5.3)
LYMPHOCYTES NFR BLD AUTO: 42 %
MCH RBC QN AUTO: 30.4 PG (ref 26.5–33)
MCHC RBC AUTO-ENTMCNC: 35 G/DL (ref 31.5–36.5)
MCV RBC AUTO: 87 FL (ref 78–100)
MONOCYTES # BLD AUTO: 0.8 10E3/UL (ref 0–1.3)
MONOCYTES NFR BLD AUTO: 10 %
NEUTROPHILS # BLD AUTO: 3.4 10E3/UL (ref 1.6–8.3)
NEUTROPHILS NFR BLD AUTO: 44 %
NRBC # BLD AUTO: 0 10E3/UL
NRBC BLD AUTO-RTO: 0 /100
PLATELET # BLD AUTO: 180 10E3/UL (ref 150–450)
POTASSIUM SERPL-SCNC: 3.5 MMOL/L (ref 3.4–5.3)
RBC # BLD AUTO: 4.57 10E6/UL (ref 4.4–5.9)
RSV RNA SPEC NAA+PROBE: NEGATIVE
SARS-COV-2 RNA RESP QL NAA+PROBE: NEGATIVE
SODIUM SERPL-SCNC: 137 MMOL/L (ref 135–145)
WBC # BLD AUTO: 7.9 10E3/UL (ref 4–11)

## 2024-05-06 PROCEDURE — 71046 X-RAY EXAM CHEST 2 VIEWS: CPT | Mod: TC

## 2024-05-06 PROCEDURE — 96374 THER/PROPH/DIAG INJ IV PUSH: CPT | Performed by: PHYSICIAN ASSISTANT

## 2024-05-06 PROCEDURE — 80048 BASIC METABOLIC PNL TOTAL CA: CPT | Performed by: PHYSICIAN ASSISTANT

## 2024-05-06 PROCEDURE — 99284 EMERGENCY DEPT VISIT MOD MDM: CPT | Performed by: PHYSICIAN ASSISTANT

## 2024-05-06 PROCEDURE — 85025 COMPLETE CBC W/AUTO DIFF WBC: CPT | Performed by: PHYSICIAN ASSISTANT

## 2024-05-06 PROCEDURE — 250N000011 HC RX IP 250 OP 636: Performed by: PHYSICIAN ASSISTANT

## 2024-05-06 PROCEDURE — 87637 SARSCOV2&INF A&B&RSV AMP PRB: CPT | Performed by: PHYSICIAN ASSISTANT

## 2024-05-06 PROCEDURE — 99284 EMERGENCY DEPT VISIT MOD MDM: CPT | Mod: 25 | Performed by: PHYSICIAN ASSISTANT

## 2024-05-06 PROCEDURE — 36415 COLL VENOUS BLD VENIPUNCTURE: CPT | Performed by: PHYSICIAN ASSISTANT

## 2024-05-06 PROCEDURE — 250N000013 HC RX MED GY IP 250 OP 250 PS 637: Performed by: PHYSICIAN ASSISTANT

## 2024-05-06 RX ORDER — AZITHROMYCIN 250 MG/1
TABLET, FILM COATED ORAL
Qty: 4 TABLET | Refills: 0 | Status: SHIPPED | OUTPATIENT
Start: 2024-05-06 | End: 2024-05-11

## 2024-05-06 RX ORDER — AZITHROMYCIN 250 MG/1
500 TABLET, FILM COATED ORAL ONCE
Status: COMPLETED | OUTPATIENT
Start: 2024-05-06 | End: 2024-05-06

## 2024-05-06 RX ORDER — BENZONATATE 100 MG/1
100 CAPSULE ORAL 3 TIMES DAILY PRN
Qty: 21 CAPSULE | Refills: 0 | Status: SHIPPED | OUTPATIENT
Start: 2024-05-06 | End: 2024-05-13

## 2024-05-06 RX ORDER — DEXAMETHASONE SODIUM PHOSPHATE 10 MG/ML
10 INJECTION, SOLUTION INTRAMUSCULAR; INTRAVENOUS ONCE
Status: COMPLETED | OUTPATIENT
Start: 2024-05-06 | End: 2024-05-06

## 2024-05-06 RX ADMIN — AZITHROMYCIN DIHYDRATE 500 MG: 250 TABLET, FILM COATED ORAL at 22:52

## 2024-05-06 RX ADMIN — DEXAMETHASONE SODIUM PHOSPHATE 10 MG: 10 INJECTION, SOLUTION INTRAMUSCULAR; INTRAVENOUS at 21:33

## 2024-05-06 ASSESSMENT — ACTIVITIES OF DAILY LIVING (ADL)
ADLS_ACUITY_SCORE: 35
ADLS_ACUITY_SCORE: 35
ADLS_ACUITY_SCORE: 33

## 2024-05-06 ASSESSMENT — COLUMBIA-SUICIDE SEVERITY RATING SCALE - C-SSRS
2. HAVE YOU ACTUALLY HAD ANY THOUGHTS OF KILLING YOURSELF IN THE PAST MONTH?: NO
1. IN THE PAST MONTH, HAVE YOU WISHED YOU WERE DEAD OR WISHED YOU COULD GO TO SLEEP AND NOT WAKE UP?: NO
6. HAVE YOU EVER DONE ANYTHING, STARTED TO DO ANYTHING, OR PREPARED TO DO ANYTHING TO END YOUR LIFE?: NO

## 2024-05-06 NOTE — LETTER
May 6, 2024      To Whom It May Concern:      Omar Hay was seen in our Emergency Department today, 05/06/24.  I expect his condition to improve over the next 1-2 days.  He may return to work when improved.    Sincerely,        JUAN LUIS Cleaning         Adbry Pregnancy And Lactation Text: It is unknown if this medication will adversely affect pregnancy or breast feeding.

## 2024-05-07 NOTE — ED TRIAGE NOTES
"ED Nursing Triage Note (General)   ________________________________    Omar Hay is a 43 year old Male that presents to triage with complaints of SOB, coughing, dizziness, and occasional wheezing.  Patient states sx started a couple weeks ago and was seen in the  where he was diagnosed with influenza A.  Patient states he returned to the  last week and nasal tests were negative.  Patient states sx have continued to persist since then.   Significant symptoms had onset 2 week(s) ago.  Vital signs:  Temp: 98.1  F (36.7  C) Temp src: Tympanic BP: 132/80 Pulse: 65   Resp: 20 SpO2: 97 %     Height: 170.2 cm (5' 7\") Weight: 65.2 kg (143 lb 12.8 oz)  Estimated body mass index is 22.52 kg/m  as calculated from the following:    Height as of this encounter: 1.702 m (5' 7\").    Weight as of this encounter: 65.2 kg (143 lb 12.8 oz).  PRE HOSPITAL PRIOR LIVING SITUATION Spouse      Triage Assessment (Adult)       Row Name 05/06/24 1956          Triage Assessment    Airway WDL WDL        Respiratory WDL    Respiratory WDL cough;X     Cough Frequency infrequent     Cough Type dry        Cardiac WDL    Cardiac WDL WDL     Cardiac Rhythm NSR        Peripheral/Neurovascular WDL    Peripheral Neurovascular WDL WDL        Cognitive/Neuro/Behavioral WDL    Cognitive/Neuro/Behavioral WDL WDL                     "

## 2024-05-07 NOTE — DISCHARGE INSTRUCTIONS
Get plenty of fluids and rest.  As discussed, your lab studies appear well and he had negative viral studies for influenza, COVID and RSV.  The chest x-ray was not officially read yet at this time but I did not see any obvious abnormalities.  However, given the length of your symptoms and the severity we will start you on an antibiotic in case there is an early pneumonia.  I expect gradual improvement of symptoms in the coming days.  Return if worsening or follow-up with PCP as needed.

## 2024-05-08 ASSESSMENT — ENCOUNTER SYMPTOMS
HEMATURIA: 0
COUGH: 1
SHORTNESS OF BREATH: 1
CHILLS: 0
FEVER: 0
FATIGUE: 1
ABDOMINAL PAIN: 0

## 2024-05-09 NOTE — ED PROVIDER NOTES
History     Chief Complaint   Patient presents with    Shortness of Breath    Cough     HPI  Omar Hay is a 43 year old male who presents to the ED for evaluation of sob, cough. He presents to triage with complaints of SOB, coughing, dizziness, and occasional wheezing.  Patient states sx started a couple weeks ago and was seen in the  where he was diagnosed with influenza A.  Patient states he returned to the  last week and nasal tests were negative.  Patient states sx have continued to persist since then.   Significant symptoms had onset 2 week(s) ago.    Allergies:  Allergies   Allergen Reactions    Penicillins Hives and Rash    Ketorolac Hives    Tramadol Hives and Nausea and Vomiting     rash       Problem List:    Patient Active Problem List    Diagnosis Date Noted    Sacroiliitis (H24) 04/30/2024     Priority: Medium    Somatic dysfunction of pelvis region 04/30/2024     Priority: Medium    Work related injury 04/30/2024     Priority: Medium    Methamphetamine abuse in remission (H) 08/17/2023     Priority: Medium    Other cerebral palsy (H) 08/17/2023     Priority: Medium    Finger amputation, no complication 08/17/2023     Priority: Medium    PTSD (post-traumatic stress disorder) 08/17/2023     Priority: Medium    Medical cannabis use 08/17/2023     Priority: Medium    Anisocoria 10/19/2017     Priority: Medium     Formatting of this note might be different from the original.   Left pupil > right pupil          Past Medical History:    History reviewed. No pertinent past medical history.    Past Surgical History:    History reviewed. No pertinent surgical history.    Family History:    History reviewed. No pertinent family history.    Social History:  Marital Status:  Single [1]  Social History     Tobacco Use    Smoking status: Some Days     Current packs/day: 0.25     Types: Cigarettes    Smokeless tobacco: Former   Vaping Use    Vaping status: Never Used   Substance Use Topics    Alcohol  "use: Not Currently     Comment: quite drinking 15 years ago    Drug use: Yes     Types: Methamphetamines, Marijuana     Comment: former meth user 3 years clean/ medical cannabis        Medications:    azithromycin (ZITHROMAX Z-BOBBY) 250 MG tablet  benzonatate (TESSALON) 100 MG capsule  albuterol (PROAIR HFA/PROVENTIL HFA/VENTOLIN HFA) 108 (90 Base) MCG/ACT inhaler  dibucaine 1 % OINT rectal ointment  DULoxetine (CYMBALTA) 20 MG capsule  medical cannabis (Patient's own supply)          Review of Systems   Constitutional:  Positive for fatigue. Negative for chills and fever.   HENT:  Negative for congestion.    Eyes:  Negative for visual disturbance.   Respiratory:  Positive for cough and shortness of breath.    Cardiovascular:  Negative for chest pain.   Gastrointestinal:  Negative for abdominal pain.   Genitourinary:  Negative for hematuria.   Allergic/Immunologic: Negative for immunocompromised state.   Neurological:  Negative for syncope.       Physical Exam   BP: 132/80  Pulse: 65  Temp: 98.1  F (36.7  C)  Resp: 20  Height: 170.2 cm (5' 7\")  Weight: 65.2 kg (143 lb 12.8 oz)  SpO2: 97 %      Physical Exam  Constitutional:       General: He is not in acute distress.     Appearance: He is well-developed. He is not diaphoretic.   HENT:      Head: Normocephalic and atraumatic.   Eyes:      General: No scleral icterus.     Conjunctiva/sclera: Conjunctivae normal.   Cardiovascular:      Rate and Rhythm: Normal rate and regular rhythm.   Pulmonary:      Effort: Pulmonary effort is normal.      Breath sounds: Rhonchi present.   Abdominal:      Palpations: Abdomen is soft.      Tenderness: There is no abdominal tenderness.   Musculoskeletal:         General: No deformity.      Cervical back: Neck supple.   Lymphadenopathy:      Cervical: No cervical adenopathy.   Skin:     General: Skin is warm and dry.      Coloration: Skin is not jaundiced.      Findings: No rash.   Neurological:      General: No focal deficit present.    "   Mental Status: He is alert. Mental status is at baseline.   Psychiatric:         Mood and Affect: Mood normal.         Behavior: Behavior normal.         ED Course        Procedures              Critical Care time:  none               No results found for this or any previous visit (from the past 24 hour(s)).    Medications   dexAMETHasone PF (DECADRON) injection 10 mg (10 mg Intravenous $Given 5/6/24 5896)   azithromycin (ZITHROMAX) tablet 500 mg (500 mg Oral $Given 5/6/24 2553)       Assessments & Plan (with Medical Decision Making)   Pt nontoxic in NAD. Heart, bowel sounds normal, abd soft, nontender to palpation, nondistended. VSS, afebrile. Mild rhonchi in lungs.     Lab studies appear well and he had negative viral studies for influenza, COVID and RSV.  The chest x-ray was not officially read yet at this time but I did not see any obvious abnormalities.  However, given the length of your symptoms and the severity we will start you on an antibiotic in case there is an early pneumonia.  I expect gradual improvement of symptoms in the coming days.  Return if worsening or follow-up with PCP as needed.    Bakari Gardner PA-C        I have reviewed the nursing notes.    I have reviewed the findings, diagnosis, plan and need for follow up with the patient.          Discharge Medication List as of 5/6/2024 10:48 PM        START taking these medications    Details   azithromycin (ZITHROMAX Z-BOBBY) 250 MG tablet Two tablets on the first day, then one tablet daily for the next 4 days, Disp-4 tablet, R-0, E-Prescribe      benzonatate (TESSALON) 100 MG capsule Take 1 capsule (100 mg) by mouth 3 times daily as needed, Disp-21 capsule, R-0, E-Prescribe             Final diagnoses:   Bronchitis       5/6/2024   [facility]       Bakari Gardner PA  05/08/24 1928

## 2024-05-13 ENCOUNTER — THERAPY VISIT (OUTPATIENT)
Dept: PHYSICAL THERAPY | Facility: OTHER | Age: 44
End: 2024-05-13
Attending: CHIROPRACTOR
Payer: OTHER MISCELLANEOUS
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
## 2024-05-13 DIAGNOSIS — M99.05 SOMATIC DYSFUNCTION OF PELVIS REGION: ICD-10-CM

## 2024-05-13 DIAGNOSIS — M46.1 SACROILIITIS (H): Primary | ICD-10-CM

## 2024-05-13 PROCEDURE — 97140 MANUAL THERAPY 1/> REGIONS: CPT | Mod: GP

## 2024-05-13 PROCEDURE — 97110 THERAPEUTIC EXERCISES: CPT | Mod: GP

## 2024-05-15 ENCOUNTER — OFFICE VISIT (OUTPATIENT)
Dept: FAMILY MEDICINE | Facility: OTHER | Age: 44
End: 2024-05-15
Payer: COMMERCIAL

## 2024-05-15 VITALS
OXYGEN SATURATION: 98 % | HEART RATE: 51 BPM | BODY MASS INDEX: 23.48 KG/M2 | RESPIRATION RATE: 17 BRPM | HEIGHT: 66 IN | SYSTOLIC BLOOD PRESSURE: 130 MMHG | WEIGHT: 146.1 LBS | DIASTOLIC BLOOD PRESSURE: 70 MMHG | TEMPERATURE: 98.1 F

## 2024-05-15 DIAGNOSIS — N45.1 ACUTE EPIDIDYMITIS: ICD-10-CM

## 2024-05-15 DIAGNOSIS — N48.9 PENILE LESION: Primary | ICD-10-CM

## 2024-05-15 LAB
ALBUMIN UR-MCNC: NEGATIVE MG/DL
APPEARANCE UR: CLEAR
BILIRUB UR QL STRIP: NEGATIVE
C TRACH DNA SPEC QL PROBE+SIG AMP: NEGATIVE
COLOR UR AUTO: NORMAL
GLUCOSE UR STRIP-MCNC: NEGATIVE MG/DL
HGB UR QL STRIP: NEGATIVE
KETONES UR STRIP-MCNC: NEGATIVE MG/DL
LEUKOCYTE ESTERASE UR QL STRIP: NEGATIVE
N GONORRHOEA DNA SPEC QL NAA+PROBE: NEGATIVE
NITRATE UR QL: NEGATIVE
PH UR STRIP: 6.5 [PH] (ref 5–9)
SP GR UR STRIP: 1.02 (ref 1–1.03)
UROBILINOGEN UR STRIP-MCNC: NORMAL MG/DL

## 2024-05-15 PROCEDURE — 81003 URINALYSIS AUTO W/O SCOPE: CPT | Mod: ZL | Performed by: NURSE PRACTITIONER

## 2024-05-15 PROCEDURE — 99213 OFFICE O/P EST LOW 20 MIN: CPT | Performed by: NURSE PRACTITIONER

## 2024-05-15 PROCEDURE — 87491 CHLMYD TRACH DNA AMP PROBE: CPT | Mod: ZL | Performed by: NURSE PRACTITIONER

## 2024-05-15 PROCEDURE — G0463 HOSPITAL OUTPT CLINIC VISIT: HCPCS

## 2024-05-15 PROCEDURE — 87205 SMEAR GRAM STAIN: CPT | Mod: ZL | Performed by: NURSE PRACTITIONER

## 2024-05-15 RX ORDER — DOXYCYCLINE 100 MG/1
100 CAPSULE ORAL 2 TIMES DAILY
Qty: 20 CAPSULE | Refills: 0 | Status: SHIPPED | OUTPATIENT
Start: 2024-05-15 | End: 2024-05-25

## 2024-05-15 ASSESSMENT — PAIN SCALES - GENERAL: PAINLEVEL: MILD PAIN (3)

## 2024-05-15 NOTE — PATIENT INSTRUCTIONS
Penile lesion appears most consistent with a folliculitis or ingrown hair-keep area clean and dry.  Avoid popping or draining lesion.    I am suspicious for epididymitis causing pain and inflammation in your left testicle    Both of these things are treatable and I do think you benefit from antibiotic treatment with doxycycline twice daily for 10 days.  Please complete full antibiotic    No evidence of inguinal hernia    Urinalysis results are processing and I will let you know those results once they return    If symptoms or not improving, penile lesion is appearing more infected, if you are having pain with intercourse or noticed any penile drainage please do not hesitate to return for further evaluation

## 2024-05-15 NOTE — PROGRESS NOTES
Omar Galdamez Hay  1980    ASSESSMENT/PLAN  1. Penile lesion  - Cyst Aerobic Bacterial Culture Routine With Gram Stain  Papular lesion on ventral side of penis shaft, wound culture obtained of drainage.  Symptoms are not consistent with chancre, HPV warts or HSV lesions.  Lesion most consistent with folliculitis.    -Recommend keeping area clean and dry, avoiding touching area, avoiding intercourse till lesion heals to prevent further irritation.  May use Tylenol and ibuprofen as needed for discomfort.  Patient will be started on doxycycline for epididymitis which would also help treat any secondary infection.  If symptoms or not improving he knows to return for further evaluation.  -Wound culture obtained and is processing    2. Acute epididymitis  - doxycycline hyclate (VIBRAMYCIN) 100 MG capsule; Take 1 capsule (100 mg) by mouth 2 times daily for 10 days  Dispense: 20 capsule; Refill: 0  - UA Macroscopic with reflex to Microscopic and Culture  - Chlamydia trachomatis/Neisseria gonorrhoeae by PCR    Patient has left testicular pain with specifically over left epididymis.  Tenderness radiates to left groin.  No evidence of inguinal hernia.  Expressed concern for epididymitis.  Urinalysis obtained and returned within normal limits, negative for leukocyte esterase, nitrates and hematuria.  Chlamydia and gonorrhea test obtained and is processing.  Recommend treatment with doxycycline twice daily for 10 days and close monitoring of progressively worsening symptoms.  If symptoms do not improve he knows to return for further evaluation.    Explanation of diagnosis, treatment options and risk and benefits of medications reviewed with patient. Patient agrees with plan of care.  All questions were answered.  Red flags symptoms were discussed and patient was advised when they should return for reevaluation or for prompt emergency evaluation.   Patient was given verbal and written instructions on plan of care.  "Instructions were printed or are available on [employer] on electronic AVS.     SUBJECTIVE  CHIEF COMPLAINT/ REASON FOR VISIT  Patient presents with:  Bump: Small bump on shaft of penis x1 wk     HISTORY OF PRESENT ILLNESS  Omar Hay is a pleasant 43 year old male presents to clinic today concerns for tender bump on penile shaft.  He wonders if this may be an ingrown hair.  About a week ago he had a tender area on the ventral side of penile shaft with a small sore bump.  This eventually developed a esparza and he was able to pop this like a pimple.  It did drain white drainage.  The bump has raised again.  It is tender.  He denies any penile discharge or burning when he pees.  No pain with sexual activity except for recurrent lesion.  He also notes that he has had some left-sided testicular pain.  He feels a pulling sensation from his left testy up to his left groin.  This has been intermittent for years.  He denies any tissue bulging and has never been diagnosed with a hernia in the past.    No new sexual partner in the past 3 years, no concern for sexually transmitted infection.    I have reviewed the nursing notes.  I have reviewed allergies, medication list, problem list, and past medical history.    REVIEW OF SYSTEMS  Review of Systems   All other systems reviewed and are negative.     VITAL SIGNS  Vitals:    05/15/24 1657   BP: 130/70   Pulse: 51   Resp: 17   Temp: 98.1  F (36.7  C)   TempSrc: Tympanic   SpO2: 98%   Weight: 66.3 kg (146 lb 1.6 oz)   Height: 1.676 m (5' 6\")      Body mass index is 23.58 kg/m .    OBJECTIVE  PHYSICAL EXAM  Physical Exam  Vitals reviewed. Exam conducted with a chaperone present.   Constitutional:       Appearance: Normal appearance. He is not ill-appearing or toxic-appearing.   HENT:      Head: Normocephalic and atraumatic.      Nose: Nose normal.   Eyes:      Conjunctiva/sclera: Conjunctivae normal.   Pulmonary:      Effort: Pulmonary effort is normal.   Abdominal:     "  Hernia: There is no hernia in the left inguinal area or right inguinal area.   Genitourinary:     Penis: Circumcised. Tenderness and lesions present.       Testes:         Left: Tenderness present. Testicular hydrocele or varicocele not present.      Epididymis:      Right: Normal.      Left: Inflamed and enlarged. Tenderness present.      Comments: Ventral side of penis tender, palpable, tan/pink papule 1 cm x 1 cm.  Minimal white thick discharge expelled with palpation.      Lymphadenopathy:      Lower Body: No right inguinal adenopathy. No left inguinal adenopathy.   Neurological:      General: No focal deficit present.      Mental Status: He is alert and oriented to person, place, and time.   Psychiatric:         Mood and Affect: Mood normal.         Behavior: Behavior normal.         Thought Content: Thought content normal.         Judgment: Judgment normal.          DIAGNOSTICS  Results for orders placed or performed in visit on 05/15/24   UA Macroscopic with reflex to Microscopic and Culture     Status: Normal    Specimen: Urine, Clean Catch   Result Value Ref Range    Color Urine Light Yellow Colorless, Straw, Light Yellow, Yellow    Appearance Urine Clear Clear    Glucose Urine Negative Negative mg/dL    Bilirubin Urine Negative Negative    Ketones Urine Negative Negative mg/dL    Specific Gravity Urine 1.025 1.000 - 1.030    Blood Urine Negative Negative    pH Urine 6.5 5.0 - 9.0    Protein Albumin Urine Negative Negative mg/dL    Urobilinogen Urine Normal Normal, 2.0 mg/dL    Nitrite Urine Negative Negative    Leukocyte Esterase Urine Negative Negative    Narrative    Microscopic not indicated        Lbira Lucas NP  [facility]

## 2024-05-15 NOTE — NURSING NOTE
"Chief Complaint   Patient presents with    Bump     Small bump on shaft of penis x1 wk     Patient here for pimply or ingrown hair on penis. Noticed about a week ago and expressed puss out of it. It is now hard and red.      Initial /70   Pulse 51   Temp 98.1  F (36.7  C) (Tympanic)   Resp 17   Ht 1.676 m (5' 6\")   Wt 66.3 kg (146 lb 1.6 oz)   SpO2 98%   BMI 23.58 kg/m   Estimated body mass index is 23.58 kg/m  as calculated from the following:    Height as of this encounter: 1.676 m (5' 6\").    Weight as of this encounter: 66.3 kg (146 lb 1.6 oz).  Medication Review: complete    The next two questions are to help us understand your food security.  If you are feeling you need any assistance in this area, we have resources available to support you today.          3/18/2024   SDOH- Food Insecurity   Within the past 12 months, did you worry that your food would run out before you got money to buy more? N   Within the past 12 months, did the food you bought just not last and you didn t have money to get more? N         Health Care Directive:  Patient does not have a Health Care Directive or Living Will: Discussed advance care planning with patient; however, patient declined at this time.    Connie Lovell LPN      "

## 2024-05-16 NOTE — RESULT ENCOUNTER NOTE
Please let patient know that STD testing came back negative and does not have a urinary tract infection either.

## 2024-05-19 LAB
BACTERIA FLD CULT: ABNORMAL
GRAM STAIN RESULT: ABNORMAL

## 2024-05-20 ENCOUNTER — THERAPY VISIT (OUTPATIENT)
Dept: PHYSICAL THERAPY | Facility: OTHER | Age: 44
End: 2024-05-20
Attending: CHIROPRACTOR
Payer: OTHER MISCELLANEOUS
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
## 2024-05-20 DIAGNOSIS — M46.1 SACROILIITIS (H): Primary | ICD-10-CM

## 2024-05-20 DIAGNOSIS — M99.05 SOMATIC DYSFUNCTION OF PELVIS REGION: ICD-10-CM

## 2024-05-20 PROCEDURE — 97110 THERAPEUTIC EXERCISES: CPT | Mod: GP

## 2024-05-20 PROCEDURE — 97140 MANUAL THERAPY 1/> REGIONS: CPT | Mod: GP

## 2024-05-23 ENCOUNTER — THERAPY VISIT (OUTPATIENT)
Dept: PHYSICAL THERAPY | Facility: OTHER | Age: 44
End: 2024-05-23
Attending: CHIROPRACTOR
Payer: OTHER MISCELLANEOUS

## 2024-05-23 DIAGNOSIS — M46.1 SACROILIITIS (H): Primary | ICD-10-CM

## 2024-05-23 DIAGNOSIS — M99.05 SOMATIC DYSFUNCTION OF PELVIS REGION: ICD-10-CM
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
## 2024-05-23 PROCEDURE — 97140 MANUAL THERAPY 1/> REGIONS: CPT | Mod: GP

## 2024-05-23 PROCEDURE — 97110 THERAPEUTIC EXERCISES: CPT | Mod: GP

## 2024-10-26 ENCOUNTER — HEALTH MAINTENANCE LETTER (OUTPATIENT)
Age: 44
End: 2024-10-26

## 2024-12-11 ENCOUNTER — OFFICE VISIT (OUTPATIENT)
Dept: FAMILY MEDICINE | Facility: OTHER | Age: 44
End: 2024-12-11
Attending: NURSE PRACTITIONER
Payer: COMMERCIAL

## 2024-12-11 VITALS
DIASTOLIC BLOOD PRESSURE: 68 MMHG | OXYGEN SATURATION: 95 % | TEMPERATURE: 97.8 F | SYSTOLIC BLOOD PRESSURE: 124 MMHG | HEIGHT: 66 IN | HEART RATE: 80 BPM | BODY MASS INDEX: 25.3 KG/M2 | RESPIRATION RATE: 16 BRPM | WEIGHT: 157.4 LBS

## 2024-12-11 DIAGNOSIS — E55.9 VITAMIN D DEFICIENCY: ICD-10-CM

## 2024-12-11 DIAGNOSIS — F41.9 ANXIETY: ICD-10-CM

## 2024-12-11 DIAGNOSIS — F15.11 METHAMPHETAMINE ABUSE IN REMISSION (H): ICD-10-CM

## 2024-12-11 DIAGNOSIS — R45.851 SUICIDAL IDEATION: ICD-10-CM

## 2024-12-11 DIAGNOSIS — F43.10 PTSD (POST-TRAUMATIC STRESS DISORDER): ICD-10-CM

## 2024-12-11 DIAGNOSIS — F32.A DEPRESSION, UNSPECIFIED DEPRESSION TYPE: Primary | ICD-10-CM

## 2024-12-11 DIAGNOSIS — Z23 IMMUNIZATION DUE: ICD-10-CM

## 2024-12-11 LAB
ANION GAP SERPL CALCULATED.3IONS-SCNC: 6 MMOL/L (ref 7–15)
BASOPHILS # BLD AUTO: 0.1 10E3/UL (ref 0–0.2)
BASOPHILS NFR BLD AUTO: 1 %
BUN SERPL-MCNC: 16 MG/DL (ref 6–20)
CALCIUM SERPL-MCNC: 9.4 MG/DL (ref 8.8–10.4)
CHLORIDE SERPL-SCNC: 103 MMOL/L (ref 98–107)
CREAT SERPL-MCNC: 0.83 MG/DL (ref 0.67–1.17)
EGFRCR SERPLBLD CKD-EPI 2021: >90 ML/MIN/1.73M2
EOSINOPHIL # BLD AUTO: 0.1 10E3/UL (ref 0–0.7)
EOSINOPHIL NFR BLD AUTO: 1 %
ERYTHROCYTE [DISTWIDTH] IN BLOOD BY AUTOMATED COUNT: 12 % (ref 10–15)
GLUCOSE SERPL-MCNC: 128 MG/DL (ref 70–99)
HCO3 SERPL-SCNC: 29 MMOL/L (ref 22–29)
HCT VFR BLD AUTO: 44.8 % (ref 40–53)
HGB BLD-MCNC: 15.7 G/DL (ref 13.3–17.7)
IMM GRANULOCYTES # BLD: 0 10E3/UL
IMM GRANULOCYTES NFR BLD: 0 %
LYMPHOCYTES # BLD AUTO: 2.3 10E3/UL (ref 0.8–5.3)
LYMPHOCYTES NFR BLD AUTO: 22 %
MCH RBC QN AUTO: 30.9 PG (ref 26.5–33)
MCHC RBC AUTO-ENTMCNC: 35 G/DL (ref 31.5–36.5)
MCV RBC AUTO: 88 FL (ref 78–100)
MONOCYTES # BLD AUTO: 0.9 10E3/UL (ref 0–1.3)
MONOCYTES NFR BLD AUTO: 9 %
NEUTROPHILS # BLD AUTO: 7 10E3/UL (ref 1.6–8.3)
NEUTROPHILS NFR BLD AUTO: 67 %
NRBC # BLD AUTO: 0 10E3/UL
NRBC BLD AUTO-RTO: 0 /100
PLATELET # BLD AUTO: 220 10E3/UL (ref 150–450)
POTASSIUM SERPL-SCNC: 4 MMOL/L (ref 3.4–5.3)
RBC # BLD AUTO: 5.08 10E6/UL (ref 4.4–5.9)
SODIUM SERPL-SCNC: 138 MMOL/L (ref 135–145)
TSH SERPL DL<=0.005 MIU/L-ACNC: 1.04 UIU/ML (ref 0.3–4.2)
VIT D+METAB SERPL-MCNC: 14 NG/ML (ref 20–50)
WBC # BLD AUTO: 10.3 10E3/UL (ref 4–11)

## 2024-12-11 PROCEDURE — 82310 ASSAY OF CALCIUM: CPT | Mod: ZL | Performed by: NURSE PRACTITIONER

## 2024-12-11 PROCEDURE — 36415 COLL VENOUS BLD VENIPUNCTURE: CPT | Mod: ZL | Performed by: NURSE PRACTITIONER

## 2024-12-11 PROCEDURE — 82306 VITAMIN D 25 HYDROXY: CPT | Mod: ZL | Performed by: NURSE PRACTITIONER

## 2024-12-11 PROCEDURE — 85004 AUTOMATED DIFF WBC COUNT: CPT | Mod: ZL | Performed by: NURSE PRACTITIONER

## 2024-12-11 PROCEDURE — 80048 BASIC METABOLIC PNL TOTAL CA: CPT | Mod: ZL | Performed by: NURSE PRACTITIONER

## 2024-12-11 PROCEDURE — G0463 HOSPITAL OUTPT CLINIC VISIT: HCPCS

## 2024-12-11 PROCEDURE — 84443 ASSAY THYROID STIM HORMONE: CPT | Mod: ZL | Performed by: NURSE PRACTITIONER

## 2024-12-11 PROCEDURE — 82565 ASSAY OF CREATININE: CPT | Mod: ZL | Performed by: NURSE PRACTITIONER

## 2024-12-11 ASSESSMENT — ASTHMA QUESTIONNAIRES
QUESTION_4 LAST FOUR WEEKS HOW OFTEN HAVE YOU USED YOUR RESCUE INHALER OR NEBULIZER MEDICATION (SUCH AS ALBUTEROL): ONCE A WEEK OR LESS
QUESTION_2 LAST FOUR WEEKS HOW OFTEN HAVE YOU HAD SHORTNESS OF BREATH: ONCE OR TWICE A WEEK
QUESTION_3 LAST FOUR WEEKS HOW OFTEN DID YOUR ASTHMA SYMPTOMS (WHEEZING, COUGHING, SHORTNESS OF BREATH, CHEST TIGHTNESS OR PAIN) WAKE YOU UP AT NIGHT OR EARLIER THAN USUAL IN THE MORNING: NOT AT ALL
QUESTION_5 LAST FOUR WEEKS HOW WOULD YOU RATE YOUR ASTHMA CONTROL: SOMEWHAT CONTROLLED
EMERGENCY_ROOM_LAST_YEAR_TOTAL: ONE
ACT_TOTALSCORE: 21
ACT_TOTALSCORE: 21
QUESTION_1 LAST FOUR WEEKS HOW MUCH OF THE TIME DID YOUR ASTHMA KEEP YOU FROM GETTING AS MUCH DONE AT WORK, SCHOOL OR AT HOME: NONE OF THE TIME

## 2024-12-11 ASSESSMENT — ANXIETY QUESTIONNAIRES
GAD7 TOTAL SCORE: 20
4. TROUBLE RELAXING: NEARLY EVERY DAY
IF YOU CHECKED OFF ANY PROBLEMS ON THIS QUESTIONNAIRE, HOW DIFFICULT HAVE THESE PROBLEMS MADE IT FOR YOU TO DO YOUR WORK, TAKE CARE OF THINGS AT HOME, OR GET ALONG WITH OTHER PEOPLE: VERY DIFFICULT
GAD7 TOTAL SCORE: 20
3. WORRYING TOO MUCH ABOUT DIFFERENT THINGS: NEARLY EVERY DAY
7. FEELING AFRAID AS IF SOMETHING AWFUL MIGHT HAPPEN: NEARLY EVERY DAY
1. FEELING NERVOUS, ANXIOUS, OR ON EDGE: NEARLY EVERY DAY
6. BECOMING EASILY ANNOYED OR IRRITABLE: NEARLY EVERY DAY
7. FEELING AFRAID AS IF SOMETHING AWFUL MIGHT HAPPEN: NEARLY EVERY DAY
2. NOT BEING ABLE TO STOP OR CONTROL WORRYING: NEARLY EVERY DAY
5. BEING SO RESTLESS THAT IT IS HARD TO SIT STILL: MORE THAN HALF THE DAYS
8. IF YOU CHECKED OFF ANY PROBLEMS, HOW DIFFICULT HAVE THESE MADE IT FOR YOU TO DO YOUR WORK, TAKE CARE OF THINGS AT HOME, OR GET ALONG WITH OTHER PEOPLE?: VERY DIFFICULT
GAD7 TOTAL SCORE: 20

## 2024-12-11 ASSESSMENT — PAIN SCALES - GENERAL: PAINLEVEL_OUTOF10: NO PAIN (0)

## 2024-12-11 ASSESSMENT — PATIENT HEALTH QUESTIONNAIRE - PHQ9
SUM OF ALL RESPONSES TO PHQ QUESTIONS 1-9: 24
10. IF YOU CHECKED OFF ANY PROBLEMS, HOW DIFFICULT HAVE THESE PROBLEMS MADE IT FOR YOU TO DO YOUR WORK, TAKE CARE OF THINGS AT HOME, OR GET ALONG WITH OTHER PEOPLE: EXTREMELY DIFFICULT
SUM OF ALL RESPONSES TO PHQ QUESTIONS 1-9: 24

## 2024-12-11 NOTE — LETTER
December 11, 2024      Omar Hay  [address]  [place] [address]        To Whom It May Concern:    Omar Hay was seen on 12/11/2024.  He may return to work on 12/16/2024 without restrictions.   Please excuse for missed days this week due to illness.       Sincerely,      Noemi Motta NP

## 2024-12-11 NOTE — LETTER
My Depression Action Plan  Name: Omar Hay   Date of Birth 1980  Date: 12/11/2024    My doctor: Carlos Ozuna   My clinic: [facility]  [address]  [place] [id number] [phone]            GREEN    ZONE   Good Control    What it looks like:   Things are going generally well. You have normal ups and downs. You may even feel depressed from time to time, but bad moods usually last less than a day.   What you need to do:  Continue to care for yourself (see self care plan)  Check your depression survival kit and update it as needed  Follow your physician s recommendations including any medication.  Do not stop taking medication unless you consult with your physician first.             YELLOW         ZONE Getting Worse    What it looks like:   Depression is starting to interfere with your life.   It may be hard to get out of bed; you may be starting to isolate yourself from others.  Symptoms of depression are starting to last most all day and this has happened for several days.   You may have suicidal thoughts but they are not constant.   What you need to do:     Call your care team. Your response to treatment will improve if you keep your care team informed of your progress. Yellow periods are signs an adjustment may need to be made.     Continue your self-care.  Just get dressed and ready for the day.  Don't give yourself time to talk yourself out of it.    Talk to someone in your support network.    Open up your Depression Self-Care Plan/Wellness Kit.             RED    ZONE Medical Alert - Get Help    What it looks like:   Depression is seriously interfering with your life.   You may experience these or other symptoms: You can t get out of bed most days, can t work or engage in other necessary activities, you have trouble taking care of basic hygiene, or basic responsibilities, thoughts of suicide or death that will not go away, self-injurious  behavior.     What you need to do:  Call your care team and request a same-day appointment. If they are not available (weekends or after hours) call your local crisis line, emergency room or 911.          Depression Self-Care Plan / Wellness Kit    Many people find that medication and therapy are helpful treatments for managing depression. In addition, making small changes to your everyday life can help to boost your mood and improve your wellbeing. Below are some tips for you to consider. Be sure to talk with your medical provider and/or behavioral health consultant if your symptoms are worsening or not improving.     Sleep   Sleep hygiene  means all of the habits that support good, restful sleep. It includes maintaining a consistent bedtime and wake time, using your bedroom only for sleeping or sex, and keeping the bedroom dark and free of distractions like a computer, smartphone, or television.     Develop a Healthy Routine  Maintain good hygiene. Get out of bed in the morning, make your bed, brush your teeth, take a shower, and get dressed. Don t spend too much time viewing media that makes you feel stressed. Find time to relax each day.    Exercise  Get some form of exercise every day. This will help reduce pain and release endorphins, the  feel good  chemicals in your brain. It can be as simple as just going for a walk or doing some gardening, anything that will get you moving.      Diet  Strive to eat healthy foods, including fruits and vegetables. Drink plenty of water. Avoid excessive sugar, caffeine, alcohol, and other mood-altering substances.     Stay Connected with Others  Stay in touch with friends and family members.    Manage Your Mood  Try deep breathing, massage therapy, biofeedback, or meditation. Take part in fun activities when you can. Try to find something to smile about each day.     Psychotherapy  Be open to working with a therapist if your provider recommends it.     Medication  Be sure to  take your medication as prescribed. Most anti-depressants need to be taken every day. It usually takes several weeks for medications to work. Not all medicines work for all people. It is important to follow-up with your provider to make sure you have a treatment plan that is working for you. Do not stop your medication abruptly without first discussing it with your provider.    Crisis Resources   These hotlines are for both adults and children. They and are open 24 hours a day, 7 days a week unless noted otherwise.    National Suicide Prevention Lifeline   988 or 8-144-343-XLOQ (8022)    Crisis Text Line    www.crisistextline.org  Text HOME to 649085 from anywhere in the [place], anytime, about any type of crisis. A live, trained crisis counselor will receive the text and respond quickly.    [employer] Lifeline for LGBTQ Youth  A national crisis intervention and suicide lifeline for LGBTQ youth under 25. Provides a safe place to talk without judgement. Call 1-245.878.4339; text START to 600095 or visit www.thetrevorproject.org to talk to a trained counselor.    For [facility] crisis numbers, visit the [facility] website at:  https://[place].gov/dhs/people-we-serve/adults/health-care/mental-health/resources/crisis-contacts.jsp

## 2024-12-11 NOTE — NURSING NOTE
"Chief Complaint   Patient presents with    Depression       Initial /68   Pulse 80   Temp 97.8  F (36.6  C) (Tympanic)   Resp 16   Ht 1.676 m (5' 6\")   Wt 71.4 kg (157 lb 6.4 oz)   SpO2 95%   BMI 25.41 kg/m   Estimated body mass index is 25.41 kg/m  as calculated from the following:    Height as of this encounter: 1.676 m (5' 6\").    Weight as of this encounter: 71.4 kg (157 lb 6.4 oz).  Medication Review: complete    The next two questions are to help us understand your food security.  If you are feeling you need any assistance in this area, we have resources available to support you today.          5/15/2024   SDOH- Food Insecurity   Within the past 12 months, did you worry that your food would run out before you got money to buy more? N   Within the past 12 months, did the food you bought just not last and you didn t have money to get more? N            Health Care Directive:  Patient does not have a Health Care Directive: Discussed advance care planning with patient; however, patient declined at this time.    Noemi Dia, [facility]        "

## 2024-12-11 NOTE — PROGRESS NOTES
Assessment & Plan   Problem List Items Addressed This Visit       Methamphetamine abuse in remission (H)    PTSD (post-traumatic stress disorder)     Other Visit Diagnoses       Depression, unspecified depression type    -  Primary    Relevant Orders    Adult Mental Health  Referral    TSH Reflex GH (Completed)    CBC and Differential (Completed)    Vitamin D Total    Basic Metabolic Panel (Completed)    Suicidal ideation        Anxiety        Relevant Orders    [facility]    TSH Reflex GH (Completed)    CBC and Differential (Completed)    Vitamin D Total    Basic Metabolic Panel (Completed)    Vitamin D deficiency        Relevant Orders    Vitamin D Total    Immunization due               1. Depression, unspecified depression type (Primary)  2. Suicidal ideation  3. Anxiety  - Adult Mental [facility] Referral; Future  - TSH Reflex GH  - CBC and Differential  - Vitamin D Total  - Basic Metabolic Panel  Overall normal labs; normal tsh. Wanted to rule out thyroid dysfunction which could have caused spontaneous heightened anxiety/depression symptoms. He does not have any active plan or intention to end his life. I have provided him with information on help services. We talked about medication management with an selective serotonin reuptake inhibitor which he strongly declines. He also is not currently taking cymbalta which he had been prescribed in the past year. Mental health/therapy referral placed.     4. Vitamin D deficiency  - Vitamin D Total  Pending result     5. Immunization due  Patient declines all immunizations today; states he is strongly against receiving immunizations at this point in his life.     6. PTSD (post-traumatic stress disorder)  7. Methamphetamine abuse in remission (H)  States he remains in remission. He sees a counselor/therapist. He was agreeable to a referral for mental health/counseling here at [facility] but declines medication management or mental  "health provider (np or MD) referral. He uses [facility] for help and services as needed.            Nicotine/Tobacco Cessation  He reports that he has been smoking cigarettes. He has quit using smokeless tobacco.  Nicotine/Tobacco Cessation Plan  Information offered: Patient not interested at this time      BMI  Estimated body mass index is 25.41 kg/m  as calculated from the following:    Height as of this encounter: 1.676 m (5' 6\").    Weight as of this encounter: 71.4 kg (157 lb 6.4 oz).       Depression Screening Follow Up        12/11/2024    10:51 AM   PHQ   PHQ-9 Total Score 24    Q9: Thoughts of better off dead/self-harm past 2 weeks Nearly every day    F/U: Thoughts of suicide or self-harm Yes    F/U: Self harm-plan Yes    F/U: Self-harm action No    F/U: Safety concerns No        Patient-reported         12/11/2024    10:51 AM   Last PHQ-9   1.  Little interest or pleasure in doing things 3    2.  Feeling down, depressed, or hopeless 3    3.  Trouble falling or staying asleep, or sleeping too much 3    4.  Feeling tired or having little energy 3    5.  Poor appetite or overeating 3    6.  Feeling bad about yourself 3    7.  Trouble concentrating 3    8.  Moving slowly or restless 0    Q9: Thoughts of better off dead/self-harm past 2 weeks 3    PHQ-9 Total Score 24    In the past two weeks have you had thoughts of suicide or self harm? Yes    Do you have concerns about your personal safety or the safety of others? No    In the past 2 weeks have you thought about a plan or had intention to harm yourself? Yes    In the past 2 weeks have you acted on these thoughts in any way? No        Patient-reported                   Follow Up Actions Taken  Crisis resource information provided in the After Visit Summary    Discussed the following ways the patient can remain in a safe environment:  remove alcohol, remove drugs, remove things I could use to hurt myself:  , and be around others  CONSULTATION/REFERRAL to " mental health/therapist     No follow-ups on file.      Ary Nelson is a 44 year old, presenting for the following health issues:  Depression        12/11/2024    10:58 AM   Additional Questions   Roomed by NURIA Franklin   Accompanied by Self     History of Present Illness       Mental Health Follow-up:  Patient presents to follow-up on Depression.Patient's depression since last visit has been:  Bad  The patient is not having other symptoms associated with depression.      Any significant life events: No  Patient is not feeling anxious or having panic attacks.  Patient has no concerns about alcohol or drug use.   He is taking medications regularly.     Presents today with concerns of worsening depression symptoms.  He tells me that he was off from work Sunday and it was a good day. He woke up Monday feeling down and suicidal. He felt like he just did not even want to function. He was having suicidal ideations, but no plan or intent to do so. He has stayed at home the past 3 days because of feeling down, called into work. He wants a work note. He tells me he is exhausted and needs a few days off to get back to his normal himself.     Work for him has been highly stressful; working 6 days a week; about 7 hours a day. He sleeps a lot on his day off. Usually works noon-7 PM. Does not get much time to himself. Has worked at [employer] for over 1 year. He wants to walk out every time he is at work; but cannot afford to do so and does not have time to look for new work.     He has tried anti depressant medications in the past which he states have not been helpful. He strongly declines initiation of any medications today.     He does not recall taking cymbalta but was prescribed this in April for hip pain following a work comp injury. He is not currently on any medications.     Daily use of medical marijuana. Smokes cigarettes.     He denies any alcohol consumption. Recovery 4 years clean from meth per statement. Not  "using other substances.     Mainly feels really unhappy at work. Has some anxiety and stress overall. No relationship concerns.     He tried to kill himself when he was 18 but mom took the knife away and he never tried again.     He states he will never kill himself. He has three children 15, 14, and 8.     Currently in treatment; does not tell me what he is in treatment for and states \"it's personal\". He has a therapist but not necessarily for his depression. He is agreeable to a referral to a new therapist here at [facility]. He does not want to take an selective serotonin reuptake inhibitor or any medication for depression.    He is agreeable to labs for thyroid, vitamin d, blood counts etc.     He goes to [facility] for services and has reportedly had a diagnostic assessment done there. No other history of mental health diagnosis.             Review of Systems  Constitutional, neuro, ENT, endocrine, pulmonary, cardiac, gastrointestinal, genitourinary, musculoskeletal, integument and psychiatric systems are negative, except as otherwise noted.      Objective    /68   Pulse 80   Temp 97.8  F (36.6  C) (Tympanic)   Resp 16   Ht 1.676 m (5' 6\")   Wt 71.4 kg (157 lb 6.4 oz)   SpO2 95%   BMI 25.41 kg/m    Body mass index is 25.41 kg/m .  Physical Exam  Vitals and nursing note reviewed.   Constitutional:       General: He is not in acute distress.     Appearance: Normal appearance. He is normal weight. He is not ill-appearing or toxic-appearing.   Cardiovascular:      Rate and Rhythm: Normal rate and regular rhythm.      Heart sounds: Normal heart sounds. No murmur heard.  Pulmonary:      Effort: Pulmonary effort is normal. No respiratory distress.      Breath sounds: Normal breath sounds. No stridor. No wheezing, rhonchi or rales.   Chest:      Chest wall: No tenderness.   Musculoskeletal:         General: Normal range of motion.   Skin:     General: Skin is warm and dry.   Neurological:      " General: No focal deficit present.      Mental Status: He is alert and oriented to person, place, and time.   Psychiatric:         Mood and Affect: Mood normal.         Behavior: Behavior normal.      Comments: Verbalizes depression/anxiety but he denies suicidal plan or intent at this time                 Results for orders placed or performed in visit on 12/11/24   TSH Reflex GH     Status: Normal   Result Value Ref Range    TSH 1.04 0.30 - 4.20 uIU/mL   Basic Metabolic Panel     Status: Abnormal   Result Value Ref Range    Sodium 138 135 - 145 mmol/L    Potassium 4.0 3.4 - 5.3 mmol/L    Chloride 103 98 - 107 mmol/L    Carbon Dioxide (CO2) 29 22 - 29 mmol/L    Anion Gap 6 (L) 7 - 15 mmol/L    Urea Nitrogen 16.0 6.0 - 20.0 mg/dL    Creatinine 0.83 0.67 - 1.17 mg/dL    GFR Estimate >90 >60 mL/min/1.73m2    Calcium 9.4 8.8 - 10.4 mg/dL    Glucose 128 (H) 70 - 99 mg/dL   CBC with platelets and differential     Status: None   Result Value Ref Range    WBC Count 10.3 4.0 - 11.0 10e3/uL    RBC Count 5.08 4.40 - 5.90 10e6/uL    Hemoglobin 15.7 13.3 - 17.7 g/dL    Hematocrit 44.8 40.0 - 53.0 %    MCV 88 78 - 100 fL    MCH 30.9 26.5 - 33.0 pg    MCHC 35.0 31.5 - 36.5 g/dL    RDW 12.0 10.0 - 15.0 %    Platelet Count 220 150 - 450 10e3/uL    % Neutrophils 67 %    % Lymphocytes 22 %    % Monocytes 9 %    % Eosinophils 1 %    % Basophils 1 %    % Immature Granulocytes 0 %    NRBCs per 100 WBC 0 <1 /100    Absolute Neutrophils 7.0 1.6 - 8.3 10e3/uL    Absolute Lymphocytes 2.3 0.8 - 5.3 10e3/uL    Absolute Monocytes 0.9 0.0 - 1.3 10e3/uL    Absolute Eosinophils 0.1 0.0 - 0.7 10e3/uL    Absolute Basophils 0.1 0.0 - 0.2 10e3/uL    Absolute Immature Granulocytes 0.0 <=0.4 10e3/uL    Absolute NRBCs 0.0 10e3/uL   CBC and Differential     Status: None    Narrative    The following orders were created for panel order CBC and Differential.  Procedure                               Abnormality         Status                     ---------                                -----------         ------                     CBC with platelets and d...[[id number]]                      Final result                 Please view results for these tests on the individual orders.         Signed Electronically by: Noemi Motta NP

## 2024-12-20 ENCOUNTER — HOSPITAL ENCOUNTER (OUTPATIENT)
Dept: GENERAL RADIOLOGY | Facility: OTHER | Age: 44
Discharge: HOME | End: 2024-12-20
Attending: FAMILY MEDICINE
Payer: COMMERCIAL

## 2024-12-20 DIAGNOSIS — M25.511 RIGHT SHOULDER PAIN, UNSPECIFIED CHRONICITY: ICD-10-CM

## 2024-12-20 PROCEDURE — 73030 X-RAY EXAM OF SHOULDER: CPT | Mod: RT

## 2025-01-13 ENCOUNTER — THERAPY VISIT (OUTPATIENT)
Dept: PHYSICAL THERAPY | Facility: OTHER | Age: 45
End: 2025-01-13
Attending: FAMILY MEDICINE
Payer: COMMERCIAL

## 2025-01-13 DIAGNOSIS — M25.511 RIGHT SHOULDER PAIN, UNSPECIFIED CHRONICITY: ICD-10-CM

## 2025-01-13 PROCEDURE — 97110 THERAPEUTIC EXERCISES: CPT | Mod: GP

## 2025-01-13 PROCEDURE — 97161 PT EVAL LOW COMPLEX 20 MIN: CPT | Mod: GP

## 2025-01-16 PROBLEM — M25.511 RIGHT SHOULDER PAIN, UNSPECIFIED CHRONICITY: Status: ACTIVE | Noted: 2025-01-16

## 2025-01-16 NOTE — PROGRESS NOTES
PHYSICAL THERAPY EVALUATION  Type of Visit: Evaluation    Fall Risk Screen:  Fall screen completed by: PT  Have you fallen 2 or more times in the past year?: Yes  Have you fallen and had an injury in the past year?: Yes  Is patient a fall risk?: Department fall risk interventions implemented    Subjective       Presenting condition or subjective complaint:  Roque comes in for pain in his right shoulder. It hurts with all motions of the R arm, worst at night because sleeping in any position hurts. He worked with PT last summer for the hip and it improved for a few months and not it has been hurting all the time again. Has to keep moving, tried tennis ball for self mobilitization but it doesn't help. Sleep is the worst. Sitting still is best. 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   Date of onset: 11/13/24    PMH: chemical dependency, depression, mental illness, smoker, CP affects L upper extremity only  Prior diagnostic imaging/testing results:     TECHNIQUE:  XR SHOULDER RIGHT 3 VIEWS FINDINGS:  No acute fracture or dislocation is identified. No suspicious osseous lesion. The glenohumeral joint spaces are preserved. Mild degenerative changes of the AC joint. No foreign body or subcutaneous emphysema.   IMPRESSION: No acute osseous abnormality.    Prior Level of Function  Transfers: Independent  Ambulation: Independent  ADL: Independent    Employment:    searching employment with auto parts     Objective   SHOULDER EVALUATION  PAIN: 4-5/10 at best, 8-9/10 at worst, sharp, aching and throbbing, all the time, worse with sitting, lifting, carrying, rest, certain positions, bending, household tasks, Better with sitting, walking, rest, nothing, changing positions  POSTURE: poor  GAIT: WFL  ROM: R flex 60 starts to hurt then 80 very painful, abd 50 pain starts, 65 most he'll go   STRENGTH: R shoulder flex, abd, ER, IR, elbow flex and ext all 4/5 limited by pain, L not  tested due to CP  SPECIAL TESTS: Fowler Alvino positive, Neer overhead positive, painful with all movements indicating impingement  PALPATION: tender all over  CERVICAL SCREEN: WFL    Assessment & Plan   CLINICAL IMPRESSIONS  Medical Diagnosis: M25.511 (ICD-10-CM) - Right shoulder pain, unspecified chronicity    Treatment Diagnosis: R shoulder pain and continued R hip pain   Impression/Assessment: Patient is a 44 year old male with complaints of R shoulder and hip pain.  The following significant findings have been identified: Pain, Decreased ROM/flexibility, Decreased strength, Impaired muscle performance, Decreased activity tolerance, and Impaired posture. These impairments interfere with their ability to perform work tasks, recreational activities, and household chores (sleeping) as compared to previous level of function.     Clinical Decision Making (Complexity):  Clinical Presentation: Evolving/Changing  Clinical Presentation Rationale: based on medical and personal factors listed in PT evaluation  Clinical Decision Making (Complexity): Moderate complexity    PLAN OF CARE  Treatment Interventions:  Modalities: Ultrasound  Interventions: Manual Therapy, Neuromuscular Re-education, Therapeutic Activity, Therapeutic Exercise    Long Term Goals     PT Goal 1  Goal Identifier: reaching  Goal Description: Pt able to reach overhead to achieve 120 degrees flexion to put parts on high shelves in 8 weeks.  Target Date: 03/10/25  PT Goal 2  Goal Identifier: sleeping  Goal Description: Pt able to lay on either side x1 hour before having to move to improve quality of sleep in 8 weeks.  Target Date: 03/10/25  PT Goal 3  Goal Identifier: HEP  Goal Description: Pt following through with HEP and independent to progress exercises to manage residual sx in 8 weeks.  Target Date: 03/10/25      Frequency of Treatment: 1-2 times per week  Duration of Treatment: 8 weeks    Education Assessment:   Learner/Method: Patient  Education  Comments: [employer] Expressions code: [id number]    Risks and benefits of evaluation/treatment have been explained.   Patient/Family/caregiver agrees with Plan of Care.     Evaluation Time:     PT Eneal, Low Complexity Minutes (82901): 20     Signing Clinician: Thea Pina DPT

## 2025-01-30 ENCOUNTER — THERAPY VISIT (OUTPATIENT)
Dept: PHYSICAL THERAPY | Facility: OTHER | Age: 45
End: 2025-01-30
Attending: FAMILY MEDICINE
Payer: COMMERCIAL

## 2025-01-30 DIAGNOSIS — M25.511 RIGHT SHOULDER PAIN, UNSPECIFIED CHRONICITY: Primary | ICD-10-CM

## 2025-01-30 PROCEDURE — 97110 THERAPEUTIC EXERCISES: CPT | Mod: GP

## 2025-01-30 PROCEDURE — 97140 MANUAL THERAPY 1/> REGIONS: CPT | Mod: GP

## 2025-02-04 ENCOUNTER — THERAPY VISIT (OUTPATIENT)
Dept: PHYSICAL THERAPY | Facility: OTHER | Age: 45
End: 2025-02-04
Attending: FAMILY MEDICINE
Payer: COMMERCIAL

## 2025-02-04 DIAGNOSIS — M25.511 RIGHT SHOULDER PAIN, UNSPECIFIED CHRONICITY: Primary | ICD-10-CM

## 2025-02-04 PROCEDURE — 97140 MANUAL THERAPY 1/> REGIONS: CPT | Mod: GP

## 2025-02-04 PROCEDURE — 97110 THERAPEUTIC EXERCISES: CPT | Mod: GP

## 2025-02-10 ENCOUNTER — THERAPY VISIT (OUTPATIENT)
Dept: PHYSICAL THERAPY | Facility: OTHER | Age: 45
End: 2025-02-10
Attending: FAMILY MEDICINE
Payer: COMMERCIAL

## 2025-02-10 DIAGNOSIS — M25.511 RIGHT SHOULDER PAIN, UNSPECIFIED CHRONICITY: Primary | ICD-10-CM

## 2025-02-10 PROCEDURE — 97110 THERAPEUTIC EXERCISES: CPT | Mod: GP

## 2025-02-10 PROCEDURE — 97140 MANUAL THERAPY 1/> REGIONS: CPT | Mod: GP

## 2025-03-05 ENCOUNTER — TELEPHONE (OUTPATIENT)
Dept: FAMILY MEDICINE | Facility: OTHER | Age: 45
End: 2025-03-05
Payer: COMMERCIAL

## 2025-03-05 NOTE — TELEPHONE ENCOUNTER
Writer left voicemail for patient that unemployment paperwork was completed by provider.      Mikki Greenfield LPN on 3/5/2025 at 8:19 AM

## 2025-03-29 ENCOUNTER — APPOINTMENT (OUTPATIENT)
Dept: GENERAL RADIOLOGY | Facility: OTHER | Age: 45
End: 2025-03-29
Attending: PHYSICIAN ASSISTANT
Payer: COMMERCIAL

## 2025-03-29 ENCOUNTER — HOSPITAL ENCOUNTER (EMERGENCY)
Facility: OTHER | Age: 45
Discharge: HOME | End: 2025-03-29
Attending: FAMILY MEDICINE
Payer: COMMERCIAL

## 2025-03-29 VITALS
WEIGHT: 151 LBS | TEMPERATURE: 98 F | DIASTOLIC BLOOD PRESSURE: 78 MMHG | BODY MASS INDEX: 23.7 KG/M2 | SYSTOLIC BLOOD PRESSURE: 128 MMHG | RESPIRATION RATE: 20 BRPM | HEIGHT: 67 IN | OXYGEN SATURATION: 99 % | HEART RATE: 76 BPM
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
## 2025-03-29 DIAGNOSIS — S41.151A DOG BITE OF RIGHT ARM, INITIAL ENCOUNTER: ICD-10-CM

## 2025-03-29 PROCEDURE — 90471 IMMUNIZATION ADMIN: CPT | Performed by: PHYSICIAN ASSISTANT

## 2025-03-29 PROCEDURE — 73060 X-RAY EXAM OF HUMERUS: CPT | Mod: RT

## 2025-03-29 PROCEDURE — 250N000009 HC RX 250: Performed by: PHYSICIAN ASSISTANT

## 2025-03-29 PROCEDURE — 99283 EMERGENCY DEPT VISIT LOW MDM: CPT | Performed by: PHYSICIAN ASSISTANT

## 2025-03-29 PROCEDURE — 99284 EMERGENCY DEPT VISIT MOD MDM: CPT | Mod: 25 | Performed by: PHYSICIAN ASSISTANT

## 2025-03-29 PROCEDURE — 250N000011 HC RX IP 250 OP 636: Performed by: PHYSICIAN ASSISTANT

## 2025-03-29 PROCEDURE — 90715 TDAP VACCINE 7 YRS/> IM: CPT | Performed by: PHYSICIAN ASSISTANT

## 2025-03-29 RX ORDER — GINSENG 100 MG
500 CAPSULE ORAL ONCE
Status: COMPLETED | OUTPATIENT
Start: 2025-03-29 | End: 2025-03-29

## 2025-03-29 RX ORDER — HYDROCODONE BITARTRATE AND ACETAMINOPHEN 5; 325 MG/1; MG/1
1 TABLET ORAL EVERY 6 HOURS PRN
Qty: 12 TABLET | Refills: 0 | Status: SHIPPED | OUTPATIENT
Start: 2025-03-29

## 2025-03-29 RX ORDER — DOXYCYCLINE 100 MG/1
100 CAPSULE ORAL 2 TIMES DAILY
Qty: 20 CAPSULE | Refills: 0 | Status: SHIPPED | OUTPATIENT
Start: 2025-03-29

## 2025-03-29 RX ADMIN — BACITRACIN 1 G: 500 OINTMENT TOPICAL at 19:58

## 2025-03-29 RX ADMIN — CLOSTRIDIUM TETANI TOXOID ANTIGEN (FORMALDEHYDE INACTIVATED), CORYNEBACTERIUM DIPHTHERIAE TOXOID ANTIGEN (FORMALDEHYDE INACTIVATED), BORDETELLA PERTUSSIS TOXOID ANTIGEN (GLUTARALDEHYDE INACTIVATED), BORDETELLA PERTUSSIS FILAMENTOUS HEMAGGLUTININ ANTIGEN (FORMALDEHYDE INACTIVATED), BORDETELLA PERTUSSIS PERTACTIN ANTIGEN, AND BORDETELLA PERTUSSIS FIMBRIAE 2/3 ANTIGEN 0.5 ML: 5; 2; 2.5; 5; 3; 5 INJECTION, SUSPENSION INTRAMUSCULAR at 19:58

## 2025-03-29 ASSESSMENT — COLUMBIA-SUICIDE SEVERITY RATING SCALE - C-SSRS
1. IN THE PAST MONTH, HAVE YOU WISHED YOU WERE DEAD OR WISHED YOU COULD GO TO SLEEP AND NOT WAKE UP?: NO
2. HAVE YOU ACTUALLY HAD ANY THOUGHTS OF KILLING YOURSELF IN THE PAST MONTH?: NO
6. HAVE YOU EVER DONE ANYTHING, STARTED TO DO ANYTHING, OR PREPARED TO DO ANYTHING TO END YOUR LIFE?: NO

## 2025-03-29 ASSESSMENT — ACTIVITIES OF DAILY LIVING (ADL): ADLS_ACUITY_SCORE: 41

## 2025-03-30 NOTE — ED TRIAGE NOTES
Pts arrives with SO, states their dogs got into fight about half ago PTA and got in middle, bleeding intact. Two puncture wounds to right arm as well as scratches to back of right arm.      Triage Assessment (Adult)       Row Name 03/29/25 1927          Triage Assessment    Airway WDL WDL        Respiratory WDL    Respiratory WDL WDL        Skin Circulation/Temperature WDL    Skin Circulation/Temperature WDL X        Cardiac WDL    Cardiac WDL WDL        Peripheral/Neurovascular WDL    Peripheral Neurovascular WDL WDL

## 2025-03-30 NOTE — ED PROVIDER NOTES
EMERGENCY DEPARTMENT ENCOUNTER      NAME: Omar Hay  AGE: 44 year old male  YOB: 1980  MRN: [id number]  EVALUATION DATE & TIME: 3/29/2025  7:24 PM    PCP: Carlos Ozuna    ED PROVIDER: Lowell Pantoja PA-C       CHIEF COMPLAINT:  Chief Complaint   Patient presents with    Dog Bite         HPI  Omar Hay is a pleasant 44 year old male who presents to the ER with his significant other for dog bite.  Patient and his significant other dogs got into a fight just prior to arrival.  Patient sustained puncture wounds and abrasions to his right arm.  Dogs are up-to-date on vaccinations.  Moderate pain.  Bleeding coming from the puncture wound sites but controlled.  No forearm or hand pain.  No numbness or paresthesias.  Last tetanus shot 2017.      REVIEW OF SYSTEMS   Review of Systems  As above, otherwise ROS is unremarkable.      PAST MEDICAL HISTORY:  No past medical history on file.      PAST SURGICAL HISTORY:  No past surgical history on file.      CURRENT MEDICATIONS:    Current Outpatient Medications   Medication Instructions    doxycycline hyclate (VIBRAMYCIN) 100 mg, Oral, 2 TIMES DAILY    HYDROcodone-acetaminophen (NORCO) 5-325 MG tablet 1 tablet, Oral, EVERY 6 HOURS PRN    medical cannabis (Patient's own supply) SEE ADMIN INSTRUCTIONS         ALLERGIES:  Allergies   Allergen Reactions    Penicillins Hives and Rash    Ketorolac Hives    Tramadol Hives and Nausea and Vomiting     rash         FAMILY HISTORY:  No family history on file.      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    Sexual activity: Yes     Partners: Female     Social Drivers of Health     Financial Resource Strain: Low Risk  (3/18/2024)    Financial Resource Strain     Within the past 12 months, have you or your family members you live with been unable to get utilities (heat, electricity) when it was really needed?: No   Food Insecurity: Low Risk  (5/15/2024)    Food Insecurity     Within the past 12 months, did you worry that your food would run out before you got money to buy more?: No     Within the past 12 months, did the food you bought just not last and you didn t have money to get more?: No   Transportation Needs: Low Risk  (3/18/2024)    Transportation Needs     Within the past 12 months, has lack of transportation kept you from medical appointments, getting your medicines, non-medical meetings or appointments, work, or from getting things that you need?: No    Received from [facility], [facility]    Social Connections   Interpersonal Safety: Low Risk  (12/20/2024)    Interpersonal Safety     Do you feel physically and emotionally safe where you currently live?: Yes     Within the past 12 months, have you been hit, slapped, kicked or otherwise physically hurt by someone?: No     Within the past 12 months, have you been humiliated or emotionally abused in other ways by your partner or ex-partner?: No   Housing Stability: Low Risk  (3/18/2024)    Housing Stability     Do you have housing? : Yes     Are you worried about losing your housing?: No       ==================================================================================================================================    PHYSICAL EXAM    VITAL SIGNS: /78   Pulse 76   Temp 98  F (36.7  C) (Oral)   Resp 20   Ht 1.702 m (5' 7\")   Wt 68.5 kg (151 lb)   SpO2 99%   BMI 23.65 kg/m    " "  Patient Vitals for the past 24 hrs:   BP Temp Temp src Pulse Resp SpO2 Height Weight   03/29/25 1930 128/78 -- -- 76 -- 99 % -- --   03/29/25 1926 130/83 98  F (36.7  C) Oral 81 20 96 % 1.702 m (5' 7\") 68.5 kg (151 lb)       Physical Exam  Vitals and nursing note reviewed.   Constitutional:       General: Alert and active.  In no acute distress.  HENT:      Nose: Nose normal.      Mouth/Throat:      Mouth: Mucous membranes are moist.      Pharynx: Oropharynx is clear.   Eyes:      Conjunctiva/sclera: Conjunctivae normal.   Musculoskeletal:   Patient with puncture wounds to the medial aspect of the right mid arm.  Patient with abrasions and swelling to the right lateral arm.  No lacerations amenable to laceration repair.  No right shoulder or right elbow tenderness.  Brisk cap refill and palpable radial pulse.  NVI  Skin:     General: Skin is warm and dry.   Neurological:      General: No focal deficit present.      Mental Status: Alert and oriented for age.   Psychiatric:         Mood and Affect: Mood normal.     ==================================================================================================================================    LABS & RADIOLOGY:  All pertinent labs reviewed and interpreted. Reviewed all pertinent imaging. Please see official radiology report.  Results for orders placed or performed during the hospital encounter of 03/29/25   XR Humerus Port Right G/E 2 Views    Impression    IMPRESSION: Right shoulder, upper arm, and elbow negative for fracture or dislocation. No radiopaque foreign body in the soft tissues. 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     XR Humerus Port Right G/E 2 Views   Final Result   IMPRESSION: Right shoulder, upper arm, and elbow negative for fracture or dislocation. No radiopaque foreign body in the soft tissues. 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    " "        ==================================================================================================================================    ED COURSE, MEDICAL DECISION MAKING, FINAL IMPRESSION AND PLAN:     Assessment / Plan:  1. Dog bite of right arm, initial encounter        The patient was interviewed and examined.  HPI and physical exam as below.  Differential diagnosis and MDM Key Documentation Elements as below.  Vitals, triage note, and nursing notes were reviewed.  /78   Pulse 76   Temp 98  F (36.7  C) (Oral)   Resp 20   Ht 1.702 m (5' 7\")   Wt 68.5 kg (151 lb)   SpO2 99%   BMI 23.65 kg/m      Differential includes but is not limited to fracture, retained foreign body, tendon injury, laceration    X-rays negative for fracture or retained foreign body.  Wound was cleaned and dressed using sterile dressing.  Tetanus shot was updated.  Patient will be placed on doxycycline twice daily for 1 week as patient has penicillin allergy.  Patient stated that dog's rabies vaccinations are up-to-date.  Declined any rabies vaccinations or immunoglobulins.  Discussed wound care.  Ibuprofen and Norco for any discomfort.  Discussed signs and symptoms of infection.  Sutures out in 10 to 14 days.  Return to the ER for any new or worsening symptoms.      Pertinent Labs & Imaging studies reviewed. (See chart for details)  Results for orders placed or performed during the hospital encounter of 03/29/25   XR Humerus Port Right G/E 2 Views    Impression    IMPRESSION: Right shoulder, upper arm, and elbow negative for fracture or dislocation. No radiopaque foreign body in the soft tissues. 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     No results found for: \"ABORH\"      Reassessments, Medications, Interventions, & Response to Treatments:  -as above    Medications given during today's ER visit:  Medications   Tdap (tetanus-diphtheria-acell pertussis) (ADACEL) injection 0.5 mL (0.5 mLs Intramuscular " $Given 3/29/25 1958)   bacitracin ointment 1 g (1 g Topical $Given 3/29/25 1958)       Consultations:  None    Decision Rules, Medical Calculators, and Risk Stratification Tools:  None    MDM Key Documentation Elements for Patient's Evaluation:  Differential diagnosis to include high risk considerations: As above  Escalation to admission/observation considered: Admission/observation considered, but patient does not meet admission criteria  Discussions and management with other clinicians:    3a. Independent interpretation of testing performed by another health professional:  -No  3b. Discussion of management or test interpretation with another health professional: -No  Independent interpretation of tests:  Ordering and/or review of 1 test(s)  Discussion of test interpretations with radiology:  No  History obtained from source other than patient or assessment requiring an independent historian:  No  Review of non-ED/external records:  review of 1 records  Diagnostic tests considered but not ultimately performed/deferred:  -CBC  Prescription medications considered but not prescribed:  -Augmentin  Chronic conditions affecting care:  -None  Care affected by social determinants of health:  -None    The patient's management involved:   - Imaging studies  - Prescription drug management      A shared decision making model was used. Time was taken to answer all questions.  Patient and/or associated parties understood and were agreeable to treatment plan.  Plan and all results were discussed. Warning signs and close return precautions to return to the ED given. Copy of results given. Discharged in stable condition. Discharged with discharge instructions outlining plan for further care and follow up.      New prescriptions started at today's ER visit  New Prescriptions    DOXYCYCLINE HYCLATE (VIBRAMYCIN) 100 MG CAPSULE    Take 1 capsule (100 mg) by mouth 2 times daily.    HYDROCODONE-ACETAMINOPHEN (NORCO) 5-325 MG TABLET     Take 1 tablet by mouth every 6 hours as needed for severe pain.       ==================================================================================================================================      I, Vincent Pantoja PA-C, personally performed the services described in this documentation, and it is both accurate and complete.       Lowell Pantoja PA-C  03/29/25 2029

## 2025-03-30 NOTE — DISCHARGE INSTRUCTIONS
-Take doxycycline 100 mg with food every 12 hours for 7 days for treatment of dog bite to prevent secondary infection  -Use ibuprofen 600 mg and Norco 1 tablet every 6 hours for pain  - Keep wound clean. You may shower normally.   - Gently wash with soap and water, apply antibiotic ointment like petroleum jelly/bacitracin, and then apply new dressing twice a day.    - Return to the ED for any signs of infection to include increasing redness, increasing swelling, increasing pain, discharge, fever, or any other new or worsening symptoms.   - Return to the ED if new or worsening symptoms.  -Your tetanus shot was updated

## 2025-04-07 ENCOUNTER — OFFICE VISIT (OUTPATIENT)
Dept: FAMILY MEDICINE | Facility: OTHER | Age: 45
End: 2025-04-07
Attending: FAMILY MEDICINE
Payer: COMMERCIAL

## 2025-04-07 VITALS
DIASTOLIC BLOOD PRESSURE: 78 MMHG | HEART RATE: 58 BPM | TEMPERATURE: 98 F | BODY MASS INDEX: 24.03 KG/M2 | RESPIRATION RATE: 20 BRPM | WEIGHT: 153.4 LBS | SYSTOLIC BLOOD PRESSURE: 116 MMHG | OXYGEN SATURATION: 97 %

## 2025-04-07 DIAGNOSIS — S41.151D DOG BITE OF RIGHT UPPER EXTREMITY, SUBSEQUENT ENCOUNTER: Primary | ICD-10-CM
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
## 2025-04-07 PROBLEM — S41.151A DOG BITE OF RIGHT UPPER EXTREMITY: Status: ACTIVE | Noted: 2025-04-07

## 2025-04-07 PROCEDURE — 99213 OFFICE O/P EST LOW 20 MIN: CPT | Performed by: FAMILY MEDICINE

## 2025-04-07 PROCEDURE — 3074F SYST BP LT 130 MM HG: CPT | Performed by: FAMILY MEDICINE

## 2025-04-07 PROCEDURE — 1125F AMNT PAIN NOTED PAIN PRSNT: CPT | Performed by: FAMILY MEDICINE

## 2025-04-07 PROCEDURE — 3078F DIAST BP <80 MM HG: CPT | Performed by: FAMILY MEDICINE

## 2025-04-07 PROCEDURE — G0463 HOSPITAL OUTPT CLINIC VISIT: HCPCS

## 2025-04-07 PROCEDURE — G2211 COMPLEX E/M VISIT ADD ON: HCPCS | Performed by: FAMILY MEDICINE

## 2025-04-07 ASSESSMENT — PAIN SCALES - GENERAL: PAINLEVEL_OUTOF10: MILD PAIN (2)

## 2025-04-07 NOTE — NURSING NOTE
Patient here for ER follow up for dog bite under the right arm. Medication Reconciliation: complete.    Vivian Last LPN  4/7/2025 2:40 PM

## 2025-04-07 NOTE — PROGRESS NOTES
SUBJECTIVE:   Omar Hay is a 44 year old male who presents to clinic today for the following health issues:    HPI  Patient arrives here for follow-up of ER dog bite.  Patient had 2 dogs fighting over a bed.  He tried to break it up in 1 bit him in the right arm.  Causing 2 puncture wounds and scratches and some ecchymosis.  He was seen in the ER.  Started on doxycycline.  And tetanus shot was given        Review of Systems     OBJECTIVE:     /78   Pulse 58   Temp 98  F (36.7  C)   Resp 20   Wt 69.6 kg (153 lb 6.4 oz)   SpO2 97%   BMI 24.03 kg/m    Body mass index is 24.03 kg/m .  Physical Exam        ASSESSMENT/PLAN:         (S41.151D,  W54.0XXD) Dog bite of right upper extremity, subsequent encounter  (primary encounter diagnosis)  Comment:   Plan: It appears to be healing well.  Cannot palpate any masses or hematoma.  No signs of secondary infection.    The longitudinal plan of care for the diagnosis(es)/condition(s) as documented were addressed during this visit. Due to the added complexity in care, I will continue to support Roque in the subsequent management and with ongoing continuity of care.    Carlos Ozuna MD  [facility]

## 2025-04-24 ENCOUNTER — OFFICE VISIT (OUTPATIENT)
Dept: FAMILY MEDICINE | Facility: OTHER | Age: 45
End: 2025-04-24
Attending: NURSE PRACTITIONER
Payer: COMMERCIAL

## 2025-04-24 VITALS
SYSTOLIC BLOOD PRESSURE: 132 MMHG | DIASTOLIC BLOOD PRESSURE: 88 MMHG | HEART RATE: 70 BPM | HEIGHT: 67 IN | BODY MASS INDEX: 24.14 KG/M2 | TEMPERATURE: 97.8 F | WEIGHT: 153.8 LBS | OXYGEN SATURATION: 95 % | RESPIRATION RATE: 16 BRPM

## 2025-04-24 DIAGNOSIS — R19.7 DIARRHEA, UNSPECIFIED TYPE: Primary | ICD-10-CM

## 2025-04-24 DIAGNOSIS — R68.83 CHILLS (WITHOUT FEVER): ICD-10-CM

## 2025-04-24 LAB
ALBUMIN SERPL BCG-MCNC: 4.5 G/DL (ref 3.5–5.2)
ALP SERPL-CCNC: 103 U/L (ref 40–150)
ALT SERPL W P-5'-P-CCNC: 20 U/L (ref 0–70)
ANION GAP SERPL CALCULATED.3IONS-SCNC: 11 MMOL/L (ref 7–15)
AST SERPL W P-5'-P-CCNC: 20 U/L (ref 0–45)
BASOPHILS # BLD AUTO: 0.1 10E3/UL (ref 0–0.2)
BASOPHILS NFR BLD AUTO: 1 %
BILIRUB SERPL-MCNC: 0.3 MG/DL
BUN SERPL-MCNC: 12.4 MG/DL (ref 6–20)
CALCIUM SERPL-MCNC: 9.1 MG/DL (ref 8.8–10.4)
CHLORIDE SERPL-SCNC: 102 MMOL/L (ref 98–107)
CREAT SERPL-MCNC: 0.66 MG/DL (ref 0.67–1.17)
CRP SERPL-MCNC: <3 MG/L
EGFRCR SERPLBLD CKD-EPI 2021: >90 ML/MIN/1.73M2
EOSINOPHIL # BLD AUTO: 0.1 10E3/UL (ref 0–0.7)
EOSINOPHIL NFR BLD AUTO: 1 %
ERYTHROCYTE [DISTWIDTH] IN BLOOD BY AUTOMATED COUNT: 12.1 % (ref 10–15)
FLUAV RNA SPEC QL NAA+PROBE: NEGATIVE
FLUBV RNA RESP QL NAA+PROBE: NEGATIVE
GLUCOSE SERPL-MCNC: 114 MG/DL (ref 70–99)
HCO3 SERPL-SCNC: 25 MMOL/L (ref 22–29)
HCT VFR BLD AUTO: 43.2 % (ref 40–53)
HGB BLD-MCNC: 15.4 G/DL (ref 13.3–17.7)
IMM GRANULOCYTES # BLD: 0 10E3/UL
IMM GRANULOCYTES NFR BLD: 0 %
LYMPHOCYTES # BLD AUTO: 3 10E3/UL (ref 0.8–5.3)
LYMPHOCYTES NFR BLD AUTO: 33 %
MCH RBC QN AUTO: 31 PG (ref 26.5–33)
MCHC RBC AUTO-ENTMCNC: 35.6 G/DL (ref 31.5–36.5)
MCV RBC AUTO: 87 FL (ref 78–100)
MONOCYTES # BLD AUTO: 0.7 10E3/UL (ref 0–1.3)
MONOCYTES NFR BLD AUTO: 8 %
NEUTROPHILS # BLD AUTO: 5.1 10E3/UL (ref 1.6–8.3)
NEUTROPHILS NFR BLD AUTO: 57 %
NRBC # BLD AUTO: 0 10E3/UL
NRBC BLD AUTO-RTO: 0 /100
PLATELET # BLD AUTO: 243 10E3/UL (ref 150–450)
POTASSIUM SERPL-SCNC: 4.2 MMOL/L (ref 3.4–5.3)
PROT SERPL-MCNC: 7.3 G/DL (ref 6.4–8.3)
RBC # BLD AUTO: 4.97 10E6/UL (ref 4.4–5.9)
RSV RNA SPEC NAA+PROBE: NEGATIVE
SARS-COV-2 RNA RESP QL NAA+PROBE: NEGATIVE
SODIUM SERPL-SCNC: 138 MMOL/L (ref 135–145)
WBC # BLD AUTO: 9 10E3/UL (ref 4–11)

## 2025-04-24 PROCEDURE — G0463 HOSPITAL OUTPT CLINIC VISIT: HCPCS

## 2025-04-24 PROCEDURE — 36415 COLL VENOUS BLD VENIPUNCTURE: CPT | Mod: ZL

## 2025-04-24 PROCEDURE — 85004 AUTOMATED DIFF WBC COUNT: CPT | Mod: ZL

## 2025-04-24 PROCEDURE — 82947 ASSAY GLUCOSE BLOOD QUANT: CPT | Mod: ZL

## 2025-04-24 PROCEDURE — 86140 C-REACTIVE PROTEIN: CPT | Mod: ZL

## 2025-04-24 PROCEDURE — 87637 SARSCOV2&INF A&B&RSV AMP PRB: CPT | Mod: ZL

## 2025-04-24 PROCEDURE — 82310 ASSAY OF CALCIUM: CPT | Mod: ZL

## 2025-04-24 ASSESSMENT — PAIN SCALES - GENERAL: PAINLEVEL_OUTOF10: MILD PAIN (3)

## 2025-04-24 NOTE — PROGRESS NOTES
ASSESSMENT/PLAN:    I have reviewed the nursing notes.  I have reviewed the findings, diagnosis, plan and need for follow up with the patient.    1. Diarrhea, unspecified type (Primary)  2. Chills (without fever)  - Influenza A/B, RSV and SARS-CoV2 PCR (COVID-19) Nose  - C. difficile Toxin B PCR with reflex to C. difficile EIA  - Enteric Bacteria and Virus Panel by LUIS E Stool  - CRP inflammation  - Comprehensive Metabolic Panel  - CBC and Differential    Patient presents with an acute illness with systemic symptoms including chills and diarrhea.  Vitals are stable and he appears nontoxic.  CBC, CMP, and CRP are stable with no concerning abnormalities.  Multiplex test was negative.  Patient was sent home with a stool collection kit and will return a sample once he is able.  C. difficile and stool culture results are pending and waiting for patient to return his stool sample.  Discussed with patient that his symptoms could be due to a viral illness but we will know more once we have his stool sample.  Advised patient to push fluids and follow a bland diet.  Advised patient to avoid any antidiarrheals at this time.  May take Tylenol and ibuprofen as needed.    Discussed warning signs/symptoms indicative of need to f/u    Follow up if symptoms persist or worsen or concerns    I explained my diagnostic considerations and recommendations to the patient, who voiced understanding and agreement with the treatment plan. All questions were answered. We discussed potential side effects of any prescribed or recommended therapies, as well as expectations for response to treatments.    Kyaw Frias, TOPHER CNP  4/24/2025  11:14 AM    HPI:    Omar Hay is a 44 year old male who presents to [facility] today for concerns of jaw pain and diarrhea    Diarrhea -   Onset: 1 day(s) ago  Description:   Consistency of stool: watery and runny  Blood in stool: No  Do symptoms wake you at night: No  Normal number of BM's/day:  1  Number of loose stools in past 24 hours: 20  Intensity: severe  History:          Exposure to anyone else with similar symptoms: No        Have you had previous episodes of diarrhea: no        Recent use of antibiotics: Yes - doxycycline on 3/29/25 for dog bite                 Recent medication-new or changes(Rx or OTC): No        Recent travels: No         Have any tests/studies been done: none  Accompanying Signs & Symptoms:   Fever: hot and cold sweats  Nausea or vomiting; YES- nausea  Abdominal pain: No  Episodes of constipation: No  Weight loss: No  Is diarrhea associated to dairy products: No  Family History of Crohn's Disease or Ulcerative Colitis: YES- aunt had Crohn's disease  Family history of Colon Cancer: No  Therapies tried: increased fluid intake and rest  Patient states that last night he noticed a swollen and tender lymph node on the left side of his neck      History reviewed. No pertinent past medical history.  History reviewed. No pertinent surgical history.  Social History     Tobacco Use    Smoking status: Former     Types: Cigarettes     Passive exposure: Current    Smokeless tobacco: Former    Tobacco comments:     Patient states he can quit on his own    Substance Use Topics    Alcohol use: Not Currently     Comment: quite drinking 13 years ago     Current Outpatient Medications   Medication Sig Dispense Refill    doxycycline hyclate (VIBRAMYCIN) 100 MG capsule Take 1 capsule (100 mg) by mouth 2 times daily. (Patient not taking: Reported on 4/24/2025) 20 capsule 0    HYDROcodone-acetaminophen (NORCO) 5-325 MG tablet Take 1 tablet by mouth every 6 hours as needed for severe pain. (Patient not taking: Reported on 4/24/2025) 12 tablet 0    medical cannabis (Patient's own supply) See Admin Instructions (The purpose of this order is to document that the patient reports taking medical cannabis.  This is not a prescription, and is not used to certify that the patient has a qualifying medical  "condition.) (Patient not taking: Reported on 4/24/2025)       Allergies   Allergen Reactions    Penicillins Hives and Rash    Ketorolac Hives    Tramadol Hives and Nausea and Vomiting     rash     Past medical history, past surgical history, current medications and allergies reviewed and accurate to the best of my knowledge.      ROS:  Refer to HPI    /88 (BP Location: Left arm, Patient Position: Chair, Cuff Size: Adult Regular)   Pulse 70   Temp 97.8  F (36.6  C) (Tympanic)   Resp 16   Ht 1.702 m (5' 7\")   Wt 69.8 kg (153 lb 12.8 oz)   SpO2 95%   BMI 24.09 kg/m      EXAM:  General Appearance: Well appearing 44 year old male, appropriate appearance for age. No acute distress   Ears: Left TM intact, translucent with bony landmarks appreciated, no erythema, no effusion, no bulging, no purulence.  Right TM intact, translucent with bony landmarks appreciated, no erythema, no effusion, no bulging, no purulence.  Left auditory canal clear.  Right auditory canal clear.  Normal external ears, non tender.  Eyes: conjunctivae normal without erythema or irritation, corneas clear, no drainage or crusting, no eyelid swelling, pupils equal   Oropharynx: moist mucous membranes, posterior pharynx without erythema, tonsils symmetric and 1+, no erythema, no exudates or petechiae, no post nasal drip seen, no trismus, voice clear.    Nose:  Bilateral nares: no erythema, no edema, no drainage or congestion   Neck: supple with left sided tonsillar lymphadenopathy  Respiratory: normal chest wall and respirations.  Normal effort.  Clear to auscultation bilaterally, no wheezing, crackles or rhonchi.  No increased work of breathing.  No cough appreciated.  Cardiac: RRR with no murmurs  Abdomen: soft, nontender, no rigidity, no rebound tenderness or guarding, normal bowel sounds present  Musculoskeletal:  Equal movement of bilateral upper extremities.  Equal movement of bilateral lower extremities.  Normal gait.  "   Dermatological: no rashes noted of exposed skin  Neuro: Alert and oriented to person, place, and time.    Psychological: normal affect, alert, oriented, and pleasant.     Labs:  Results for orders placed or performed in visit on 04/24/25   Influenza A/B, RSV and SARS-CoV2 PCR (COVID-19) Nose     Status: Normal    Specimen: Nose; Swab   Result Value Ref Range    Influenza A PCR Negative Negative    Influenza B PCR Negative Negative    RSV PCR Negative Negative    SARS CoV2 PCR Negative Negative    Narrative    Testing was performed using the Xpert Xpress CoV2/Flu/RSV Assay on the Cepheid GeneXpert Instrument. This test should be ordered for the detection of SARS-CoV2, influenza, and RSV viruses in individuals with signs and symptoms of respiratory tract infection. This test is for in vitro diagnostic use under the [place] FDA for laboratories certified under CLIA to perform high or moderate complexity testing. This test has been [place] FDA cleared. A negative result does not rule out the presence of PCR inhibitors in the specimen or target RNA in concentration below the limit of detection for the assay. If only one viral target is positive but coinfection with multiple targets is suspected, the sample should be re-tested with another FDA cleared, approved, or authorized test, if coninfection would change clinical management. This test was validated by the [facility]. These laboratories are certified under the Clinical Laboratory Improvement Amendments of 1988 (CLIA-88) as qualified to perfom high complexity laboratory testing.   CRP inflammation     Status: Normal   Result Value Ref Range    CRP Inflammation <3.00 <5.00 mg/L   Comprehensive Metabolic Panel     Status: Abnormal   Result Value Ref Range    Sodium 138 135 - 145 mmol/L    Potassium 4.2 3.4 - 5.3 mmol/L    Carbon Dioxide (CO2) 25 22 - 29 mmol/L    Anion Gap 11 7 - 15 mmol/L    Urea Nitrogen 12.4 6.0 - 20.0 mg/dL    Creatinine 0.66 (L) 0.67 -  1.17 mg/dL    GFR Estimate >90 >60 mL/min/1.73m2    Calcium 9.1 8.8 - 10.4 mg/dL    Chloride 102 98 - 107 mmol/L    Glucose 114 (H) 70 - 99 mg/dL    Alkaline Phosphatase 103 40 - 150 U/L    AST 20 0 - 45 U/L    ALT 20 0 - 70 U/L    Protein Total 7.3 6.4 - 8.3 g/dL    Albumin 4.5 3.5 - 5.2 g/dL    Bilirubin Total 0.3 <=1.2 mg/dL   CBC with platelets and differential     Status: None   Result Value Ref Range    WBC Count 9.0 4.0 - 11.0 10e3/uL    RBC Count 4.97 4.40 - 5.90 10e6/uL    Hemoglobin 15.4 13.3 - 17.7 g/dL    Hematocrit 43.2 40.0 - 53.0 %    MCV 87 78 - 100 fL    MCH 31.0 26.5 - 33.0 pg    MCHC 35.6 31.5 - 36.5 g/dL    RDW 12.1 10.0 - 15.0 %    Platelet Count 243 150 - 450 10e3/uL    % Neutrophils 57 %    % Lymphocytes 33 %    % Monocytes 8 %    % Eosinophils 1 %    % Basophils 1 %    % Immature Granulocytes 0 %    NRBCs per 100 WBC 0 <1 /100    Absolute Neutrophils 5.1 1.6 - 8.3 10e3/uL    Absolute Lymphocytes 3.0 0.8 - 5.3 10e3/uL    Absolute Monocytes 0.7 0.0 - 1.3 10e3/uL    Absolute Eosinophils 0.1 0.0 - 0.7 10e3/uL    Absolute Basophils 0.1 0.0 - 0.2 10e3/uL    Absolute Immature Granulocytes 0.0 <=0.4 10e3/uL    Absolute NRBCs 0.0 10e3/uL   CBC and Differential     Status: None    Narrative    The following orders were created for panel order CBC and Differential.  Procedure                               Abnormality         Status                     ---------                               -----------         ------                     CBC with platelets and ...[[id number]]                      Final result                 Please view results for these tests on the individual orders.

## 2025-04-24 NOTE — LETTER
April 24, 2025      Omar Hay  [address]  [facility] [address]        To Whom It May Concern:    Omar Hay  was seen on 4/24/25.  Please excuse him  until he is diarrhea free for 24 hours.        Sincerely,        TOPHER Almendarez CNP

## 2025-04-24 NOTE — NURSING NOTE
"Chief Complaint   Patient presents with    Dental Pain     Left Lower Jaw Pain -Started Last Night    Diarrhea     X 2-3 Days, Nausea, Chills/Sweats       Initial /88 (BP Location: Left arm, Patient Position: Chair, Cuff Size: Adult Regular)   Pulse 70   Temp 97.8  F (36.6  C) (Tympanic)   Resp 16   Ht 1.702 m (5' 7\")   Wt 69.8 kg (153 lb 12.8 oz)   SpO2 95%   BMI 24.09 kg/m   Estimated body mass index is 24.09 kg/m  as calculated from the following:    Height as of this encounter: 1.702 m (5' 7\").    Weight as of this encounter: 69.8 kg (153 lb 12.8 oz).      Medication Reconciliation: Complete.       Maria Luz Clemente LPN on 4/24/2025 at 11:10 AM     "

## 2025-06-12 ENCOUNTER — TELEPHONE (OUTPATIENT)
Dept: FAMILY MEDICINE | Facility: OTHER | Age: 45
End: 2025-06-12

## 2025-06-12 ENCOUNTER — OFFICE VISIT (OUTPATIENT)
Dept: FAMILY MEDICINE | Facility: OTHER | Age: 45
End: 2025-06-12
Payer: COMMERCIAL

## 2025-06-12 VITALS
OXYGEN SATURATION: 97 % | RESPIRATION RATE: 19 BRPM | BODY MASS INDEX: 23.86 KG/M2 | WEIGHT: 152 LBS | SYSTOLIC BLOOD PRESSURE: 138 MMHG | HEIGHT: 67 IN | DIASTOLIC BLOOD PRESSURE: 82 MMHG | TEMPERATURE: 97.5 F | HEART RATE: 83 BPM

## 2025-06-12 DIAGNOSIS — R11.0 NAUSEA: ICD-10-CM

## 2025-06-12 DIAGNOSIS — R10.31 ABDOMINAL PAIN, RIGHT LOWER QUADRANT: ICD-10-CM

## 2025-06-12 DIAGNOSIS — R19.7 DIARRHEA, UNSPECIFIED TYPE: Primary | ICD-10-CM

## 2025-06-12 LAB
ALBUMIN SERPL BCG-MCNC: 4.7 G/DL (ref 3.5–5.2)
ALP SERPL-CCNC: 101 U/L (ref 40–150)
ALT SERPL W P-5'-P-CCNC: 20 U/L (ref 0–70)
ANION GAP SERPL CALCULATED.3IONS-SCNC: 11 MMOL/L (ref 7–15)
AST SERPL W P-5'-P-CCNC: 22 U/L (ref 0–45)
BASOPHILS # BLD AUTO: 0.1 10E3/UL (ref 0–0.2)
BASOPHILS NFR BLD AUTO: 1 %
BILIRUB SERPL-MCNC: 0.5 MG/DL
BUN SERPL-MCNC: 20.5 MG/DL (ref 6–20)
CALCIUM SERPL-MCNC: 9.9 MG/DL (ref 8.8–10.4)
CHLORIDE SERPL-SCNC: 100 MMOL/L (ref 98–107)
CREAT SERPL-MCNC: 0.83 MG/DL (ref 0.67–1.17)
CRP SERPL-MCNC: <3 MG/L
EGFRCR SERPLBLD CKD-EPI 2021: >90 ML/MIN/1.73M2
EOSINOPHIL # BLD AUTO: 0 10E3/UL (ref 0–0.7)
EOSINOPHIL NFR BLD AUTO: 0 %
ERYTHROCYTE [DISTWIDTH] IN BLOOD BY AUTOMATED COUNT: 12 % (ref 10–15)
GLUCOSE SERPL-MCNC: 113 MG/DL (ref 70–99)
HCO3 SERPL-SCNC: 26 MMOL/L (ref 22–29)
HCT VFR BLD AUTO: 46.6 % (ref 40–53)
HGB BLD-MCNC: 16.1 G/DL (ref 13.3–17.7)
HOLD SPECIMEN: NORMAL
IMM GRANULOCYTES # BLD: 0 10E3/UL
IMM GRANULOCYTES NFR BLD: 0 %
LYMPHOCYTES # BLD AUTO: 2.2 10E3/UL (ref 0.8–5.3)
LYMPHOCYTES NFR BLD AUTO: 23 %
MCH RBC QN AUTO: 30.2 PG (ref 26.5–33)
MCHC RBC AUTO-ENTMCNC: 34.5 G/DL (ref 31.5–36.5)
MCV RBC AUTO: 87 FL (ref 78–100)
MONOCYTES # BLD AUTO: 0.8 10E3/UL (ref 0–1.3)
MONOCYTES NFR BLD AUTO: 9 %
NEUTROPHILS # BLD AUTO: 6.2 10E3/UL (ref 1.6–8.3)
NEUTROPHILS NFR BLD AUTO: 66 %
NRBC # BLD AUTO: 0 10E3/UL
NRBC BLD AUTO-RTO: 0 /100
PLATELET # BLD AUTO: 223 10E3/UL (ref 150–450)
POTASSIUM SERPL-SCNC: 4.1 MMOL/L (ref 3.4–5.3)
PROT SERPL-MCNC: 7.6 G/DL (ref 6.4–8.3)
RBC # BLD AUTO: 5.33 10E6/UL (ref 4.4–5.9)
SODIUM SERPL-SCNC: 137 MMOL/L (ref 135–145)
WBC # BLD AUTO: 9.4 10E3/UL (ref 4–11)

## 2025-06-12 PROCEDURE — 85025 COMPLETE CBC W/AUTO DIFF WBC: CPT | Mod: ZL | Performed by: NURSE PRACTITIONER

## 2025-06-12 PROCEDURE — 86140 C-REACTIVE PROTEIN: CPT | Mod: ZL | Performed by: NURSE PRACTITIONER

## 2025-06-12 PROCEDURE — G0463 HOSPITAL OUTPT CLINIC VISIT: HCPCS

## 2025-06-12 PROCEDURE — 84132 ASSAY OF SERUM POTASSIUM: CPT | Mod: ZL | Performed by: NURSE PRACTITIONER

## 2025-06-12 PROCEDURE — 36415 COLL VENOUS BLD VENIPUNCTURE: CPT | Mod: ZL | Performed by: NURSE PRACTITIONER

## 2025-06-12 ASSESSMENT — ENCOUNTER SYMPTOMS
FEVER: 0
NAUSEA: 0
ENDOCRINE NEGATIVE: 1
HEMATOLOGIC/LYMPHATIC NEGATIVE: 1
FATIGUE: 0
NEUROLOGICAL NEGATIVE: 1
ANAL BLEEDING: 0
ACTIVITY CHANGE: 0
PSYCHIATRIC NEGATIVE: 1
EYES NEGATIVE: 1
VOMITING: 0
BLOOD IN STOOL: 0
APPETITE CHANGE: 0
RESPIRATORY NEGATIVE: 1
DIARRHEA: 1
ABDOMINAL PAIN: 1
MUSCULOSKELETAL NEGATIVE: 1
CARDIOVASCULAR NEGATIVE: 1

## 2025-06-12 ASSESSMENT — PAIN SCALES - GENERAL: PAINLEVEL_OUTOF10: MILD PAIN (3)

## 2025-06-12 ASSESSMENT — ASTHMA QUESTIONNAIRES
ACT_TOTALSCORE: 25
QUESTION_5 LAST FOUR WEEKS HOW WOULD YOU RATE YOUR ASTHMA CONTROL: COMPLETELY CONTROLLED
QUESTION_2 LAST FOUR WEEKS HOW OFTEN HAVE YOU HAD SHORTNESS OF BREATH: NOT AT ALL
QUESTION_3 LAST FOUR WEEKS HOW OFTEN DID YOUR ASTHMA SYMPTOMS (WHEEZING, COUGHING, SHORTNESS OF BREATH, CHEST TIGHTNESS OR PAIN) WAKE YOU UP AT NIGHT OR EARLIER THAN USUAL IN THE MORNING: NOT AT ALL
QUESTION_4 LAST FOUR WEEKS HOW OFTEN HAVE YOU USED YOUR RESCUE INHALER OR NEBULIZER MEDICATION (SUCH AS ALBUTEROL): NOT AT ALL
QUESTION_1 LAST FOUR WEEKS HOW MUCH OF THE TIME DID YOUR ASTHMA KEEP YOU FROM GETTING AS MUCH DONE AT WORK, SCHOOL OR AT HOME: NONE OF THE TIME

## 2025-06-12 NOTE — TELEPHONE ENCOUNTER
I did not provide patient with a doctor's note.    Katherine Cifuentes CNP on 6/12/2025 at 2:16 PM

## 2025-06-12 NOTE — TELEPHONE ENCOUNTER
Nurse wrote the wrong last name on patient's work note. Patient did look at note before leaving but did not say anything about it. Provider with type up a note in MyChart for patient to  at the  . Patient is aware of this.     Connie Lovell LPN on 6/12/2025 at 2:22 PM

## 2025-06-12 NOTE — PROGRESS NOTES
Omar Hay  1980    ASSESSMENT/PLAN      Presents to [facility] clinic with mild abdominal pain and diarrhea intermittently for the past 2 months.  Patient was seen 4/24/2025 for similar symptoms.  Labs at that time were normal.  He did not return the stool samples ordered from that visit.  Patient states he has had ongoing diarrhea from 1-10 times a day since that visit.  Patient has had no known exposures to specific illness.  No new exposures, new animals, recent move, no new water supply. No recent travel.  No medication changes.  Labs obtained today and within normal limits.  Discussed with patient that he will need to return the stool testing and we can make decisions from there.  Patient may need further tests / imaging or colonoscopy but unclear at this time.  Patient's vitals are stable and he appears nontoxic.        1. Diarrhea, unspecified type (Primary)  2. Abdominal pain, right lower quadrant  3. Nausea      - CBC and Differential  - Comprehensive Metabolic Panel  - CRP inflammation    - Enteric Bacteria and Virus Panel PCR; Future  - C. difficile Toxin B PCR with reflex to C. difficile EIA; Future       - May use over-the-counter Tylenol or ibuprofen PRN  - Follow up as needed for new or worsening symptoms        *A shared decision making model was used.   *Patient and/or associated parties understood and were agreeable to treatment plan.   *Plan and all results were discussed.   *Explanation of diagnosis, treatment options and risk and benefits of medications reviewed with patient.    *Time was taken to answer all questions.   *Red flags symptoms were discussed and patient was advised when they should return for reevaluation or for prompt emergency evaluation.   *Patient was given verbal and written instructions on plan of care. Instructions were printed or are available on [employer] on electronic AVS.   *We discussed potential side effects of any prescribed or recommended therapies, as well  "as expectations for response to treatments.  *Patient discharged in stable condition    Katherine Cifuentes CNP  [facility]    SUBJECTIVE  CHIEF COMPLAINT/ REASON FOR VISIT  Patient presents with:  Abdominal Pain  Diarrhea     HISTORY OF PRESENT ILLNESS  Omar Hay is a pleasant 44 year old male presents to [facility] clinic today with mild abdominal pain and diarrhea intermittently for the past 2 months.  Patient was seen 4/24/2025 for similar symptoms.  Labs at that time were normal.  He did not return the stool samples.  Patient states he has had ongoing diarrhea from 1-10 times a day since that visit.  Patient has had no known exposures to specific illness.  No new exposure  animals, recent move, no new water supply.  No medication changes.      I have reviewed the nursing notes.  I have reviewed allergies, medication list, problem list, and past medical history.    REVIEW OF SYSTEMS  Review of Systems   Constitutional:  Negative for activity change, appetite change, fatigue and fever.   HENT: Negative.     Eyes: Negative.    Respiratory: Negative.     Cardiovascular: Negative.    Gastrointestinal:  Positive for abdominal pain and diarrhea. Negative for anal bleeding, blood in stool, nausea and vomiting.   Endocrine: Negative.    Genitourinary: Negative.    Musculoskeletal: Negative.    Skin: Negative.    Neurological: Negative.    Hematological: Negative.    Psychiatric/Behavioral: Negative.     All other systems reviewed and are negative.       VITAL SIGNS  Vitals:    06/12/25 1023   BP: 138/82   BP Location: Right arm   Patient Position: Sitting   Cuff Size: Adult Regular   Pulse: 83   Resp: 19   Temp: 97.5  F (36.4  C)   TempSrc: Tympanic   SpO2: 97%   Weight: 68.9 kg (152 lb)   Height: 1.702 m (5' 7\")      Body mass index is 23.81 kg/m .      OBJECTIVE  PHYSICAL EXAM  Physical Exam  Vitals and nursing note reviewed.   Constitutional:       Appearance: Normal appearance.   HENT:      " Head: Normocephalic.      Nose: Nose normal.      Mouth/Throat:      Mouth: Mucous membranes are moist.   Eyes:      Pupils: Pupils are equal, round, and reactive to light.   Cardiovascular:      Rate and Rhythm: Normal rate.      Pulses: Normal pulses.   Pulmonary:      Effort: Pulmonary effort is normal.   Abdominal:      Palpations: Abdomen is soft.   Musculoskeletal:         General: Normal range of motion.      Cervical back: Normal range of motion.   Skin:     General: Skin is warm and dry.      Capillary Refill: Capillary refill takes less than 2 seconds.   Neurological:      General: No focal deficit present.      Mental Status: He is alert.            DIAGNOSTICS    Results for orders placed or performed in visit on 06/12/25   Comprehensive Metabolic Panel     Status: Abnormal   Result Value Ref Range    Sodium 137 135 - 145 mmol/L    Potassium 4.1 3.4 - 5.3 mmol/L    Carbon Dioxide (CO2) 26 22 - 29 mmol/L    Anion Gap 11 7 - 15 mmol/L    Urea Nitrogen 20.5 (H) 6.0 - 20.0 mg/dL    Creatinine 0.83 0.67 - 1.17 mg/dL    GFR Estimate >90 >60 mL/min/1.73m2    Calcium 9.9 8.8 - 10.4 mg/dL    Chloride 100 98 - 107 mmol/L    Glucose 113 (H) 70 - 99 mg/dL    Alkaline Phosphatase 101 40 - 150 U/L    AST 22 0 - 45 U/L    ALT 20 0 - 70 U/L    Protein Total 7.6 6.4 - 8.3 g/dL    Albumin 4.7 3.5 - 5.2 g/dL    Bilirubin Total 0.5 <=1.2 mg/dL   CRP inflammation     Status: Normal   Result Value Ref Range    CRP Inflammation <3.00 <5.00 mg/L   CBC with platelets and differential     Status: None   Result Value Ref Range    WBC Count 9.4 4.0 - 11.0 10e3/uL    RBC Count 5.33 4.40 - 5.90 10e6/uL    Hemoglobin 16.1 13.3 - 17.7 g/dL    Hematocrit 46.6 40.0 - 53.0 %    MCV 87 78 - 100 fL    MCH 30.2 26.5 - 33.0 pg    MCHC 34.5 31.5 - 36.5 g/dL    RDW 12.0 10.0 - 15.0 %    Platelet Count 223 150 - 450 10e3/uL    % Neutrophils 66 %    % Lymphocytes 23 %    % Monocytes 9 %    % Eosinophils 0 %    % Basophils 1 %    % Immature  Granulocytes 0 %    NRBCs per 100 WBC 0 <1 /100    Absolute Neutrophils 6.2 1.6 - 8.3 10e3/uL    Absolute Lymphocytes 2.2 0.8 - 5.3 10e3/uL    Absolute Monocytes 0.8 0.0 - 1.3 10e3/uL    Absolute Eosinophils 0.0 0.0 - 0.7 10e3/uL    Absolute Basophils 0.1 0.0 - 0.2 10e3/uL    Absolute Immature Granulocytes 0.0 <=0.4 10e3/uL    Absolute NRBCs 0.0 10e3/uL   Extra Tube     Status: None    Narrative    The following orders were created for panel order Extra Tube.  Procedure                               Abnormality         Status                     ---------                               -----------         ------                     Extra Red Top Tube[[id number]]                              Final result                 Please view results for these tests on the individual orders.   Extra Red Top Tube     Status: None   Result Value Ref Range    Hold Specimen [facility]    CBC and Differential     Status: None    Narrative    The following orders were created for panel order CBC and Differential.  Procedure                               Abnormality         Status                     ---------                               -----------         ------                     CBC with platelets and ...[[id number]]                      Final result                 Please view results for these tests on the individual orders.

## 2025-06-12 NOTE — PROGRESS NOTES
"Chief Complaint   Patient presents with    Abdominal Pain    Diarrhea   Patient is here to be seen for abdominal pain and diarrhea.    FOOD SECURITY SCREENING QUESTIONS  Hunger Vital Signs:  Within the past 12 months we worried whether our food would run out before we got money to buy more. Never  Within the past 12 months the food we bought just didn't last and we didn't have money to get more. Never  Aimee Martines 6/12/2025 10:26 AM      Initial /82 (BP Location: Right arm, Patient Position: Sitting, Cuff Size: Adult Regular)   Pulse 83   Temp 97.5  F (36.4  C) (Tympanic)   Resp 19   Ht 1.702 m (5' 7\")   Wt 68.9 kg (152 lb)   SpO2 97%   BMI 23.81 kg/m   Estimated body mass index is 23.81 kg/m  as calculated from the following:    Height as of this encounter: 1.702 m (5' 7\").    Weight as of this encounter: 68.9 kg (152 lb).  Medication Reconciliation: complete    Aimee Martines   "

## 2025-06-12 NOTE — LETTER
June 12, 2025      Omar Hay  [address]  [facility] [address]        To Whom It May Concern:    Omar Hay was seen in our clinic 6/12/25. He may return to work without restrictions 6/13/25.      Sincerely,        Katherine Cifuentes CNP    Electronically signed          
No

## 2025-06-12 NOTE — TELEPHONE ENCOUNTER
The doctor's note you gave him has the wrong last name on it.  Please call.      Radha Belcher on 6/12/2025 at 1:57 PM

## 2025-06-17 ENCOUNTER — HOSPITAL ENCOUNTER (EMERGENCY)
Facility: OTHER | Age: 45
Discharge: HOME | End: 2025-06-17
Attending: FAMILY MEDICINE
Payer: COMMERCIAL

## 2025-06-17 VITALS
HEART RATE: 60 BPM | SYSTOLIC BLOOD PRESSURE: 123 MMHG | DIASTOLIC BLOOD PRESSURE: 74 MMHG | RESPIRATION RATE: 14 BRPM | BODY MASS INDEX: 23.86 KG/M2 | OXYGEN SATURATION: 98 % | HEIGHT: 67 IN | WEIGHT: 152 LBS | TEMPERATURE: 97.3 F

## 2025-06-17 DIAGNOSIS — K64.4 EXTERNAL HEMORRHOIDS: ICD-10-CM

## 2025-06-17 PROCEDURE — 99283 EMERGENCY DEPT VISIT LOW MDM: CPT | Performed by: FAMILY MEDICINE

## 2025-06-17 RX ORDER — LIDOCAINE HYDROCHLORIDE AND HYDROCORTISONE ACETATE 30; 5 MG/G; MG/G
1 CREAM RECTAL PRN
Qty: 28.3 G | Refills: 0 | Status: SHIPPED | OUTPATIENT
Start: 2025-06-17

## 2025-06-17 ASSESSMENT — ENCOUNTER SYMPTOMS: ROS GI COMMENTS: RECTAL ITCHING

## 2025-06-17 NOTE — DISCHARGE INSTRUCTIONS
Omar    I did send a prescription to the [facility] for you.     Thank you for choosing our Emergency Department for your care.     You may receive a phone call or letter for a survey about your care in our ED.  Please complete this as this is how we improve care for our patients.     If you have any questions after leaving the ED you can call or text me on my cell phone at [phone].  I am not on call so if I do not answer my phone please leave a message- I will get back to you.  If you are not doing well please return to the ED.     Sincerely,    Dr Peewee Sherman M.D.  Medical Director  [facility] Emergency Department

## 2025-06-17 NOTE — ED PROVIDER NOTES
History     Chief Complaint   Patient presents with    Hemorrhoids     The history is provided by the patient.     Omar Hay is a 44 year old male here with symptomatic external hemorrhoids. No other concerns.    Allergies:  Allergies   Allergen Reactions    Penicillins Hives and Rash    Ketorolac Hives    Tramadol Hives and Nausea and Vomiting     rash       Problem List:    Patient Active Problem List    Diagnosis Date Noted    Dog bite of right upper extremity 04/07/2025     Priority: Medium    Right shoulder pain, unspecified chronicity 01/16/2025     Priority: Medium    Sacroiliitis 04/30/2024     Priority: Medium    Somatic dysfunction of pelvis region 04/30/2024     Priority: Medium    Methamphetamine abuse in remission (H) 08/17/2023     Priority: Medium    Other cerebral palsy (H) 08/17/2023     Priority: Medium    Finger amputation, no complication 08/17/2023     Priority: Medium    PTSD (post-traumatic stress disorder) 08/17/2023     Priority: Medium    Medical cannabis use 08/17/2023     Priority: Medium    Anisocoria 10/19/2017     Priority: Medium     Formatting of this note might be different from the original.   Left pupil > right pupil          Past Medical History:    No past medical history on file.    Past Surgical History:    No past surgical history on file.    Family History:    No family history on file.    Social History:  Marital Status:  Single [1]  Social History     Tobacco Use    Smoking status: Former     Types: Cigarettes     Passive exposure: Current    Smokeless tobacco: Former    Tobacco comments:     Patient states he can quit on his own    Vaping Use    Vaping status: Never Used   Substance Use Topics    Alcohol use: Not Currently     Comment: quite drinking 13 years ago    Drug use: Yes     Types: Marijuana     Comment: former meth user 3 years clean/ medical cannabis        Medications:    doxycycline hyclate (VIBRAMYCIN) 100 MG capsule  HYDROcodone-acetaminophen  "(NORCO) 5-325 MG tablet  Lidocaine-Hydrocort, Perianal, 3-0.5 % CREA  medical cannabis (Patient's own supply)          Review of Systems   Gastrointestinal:         Rectal itching   All other systems reviewed and are negative.      Physical Exam   BP: 123/74  Pulse: 60  Temp: 97.3  F (36.3  C)  Resp: 14  Height: 170.2 cm (5' 7\")  Weight: 68.9 kg (152 lb)  SpO2: 98 %      Physical Exam  Vitals and nursing note reviewed.   Genitourinary:     Comments: Small external hemorrhoids noted.      Assessments & Plan (with Medical Decision Making)  Omar Hay is a 44 year old male here with symptomatic external hemorrhoids. No other concerns.  VS in the ED /74   Pulse 60   Temp 97.3  F (36.3  C) (Tympanic)   Resp 14   Ht 1.702 m (5' 7\")   Wt 68.9 kg (152 lb)   SpO2 98%   BMI 23.81 kg/m    Exam shows small external hemorrhoids.   Recommend ointment, Rx to [facility].     I have reviewed the nursing notes.    I have reviewed the findings, diagnosis, plan and need for follow up with the patient.  Medical Decision Making  The patient's presentation was of straightforward complexity (a clearly self-limited or minor problem).    The patient's evaluation involved:  history and exam without other MDM data elements    The patient's management necessitated moderate risk (prescription drug management including medications given in the ED).        New Prescriptions    LIDOCAINE-HYDROCORT, PERIANAL, 3-0.5 % CREA    Externally apply 1 Application topically as needed.       Final diagnoses:   External hemorrhoids       6/17/2025   [facility] AND [facility], Luis Cotto MD  06/17/25 0545    "

## 2025-06-17 NOTE — ED TRIAGE NOTES
Pt arrives via private car.  Pt states that he has had an external hemorrhoid for the past 2 days and felt the itching and pain has gotten worse (5/10).

## 2025-06-18 ENCOUNTER — LAB (OUTPATIENT)
Dept: LAB | Facility: OTHER | Age: 45
End: 2025-06-18
Payer: COMMERCIAL

## 2025-06-18 DIAGNOSIS — R19.7 DIARRHEA, UNSPECIFIED TYPE: ICD-10-CM

## 2025-06-18 DIAGNOSIS — R11.0 NAUSEA: ICD-10-CM

## 2025-06-18 DIAGNOSIS — R10.31 ABDOMINAL PAIN, RIGHT LOWER QUADRANT: ICD-10-CM

## 2025-06-18 LAB

## 2025-06-18 PROCEDURE — 87507 IADNA-DNA/RNA PROBE TQ 12-25: CPT | Mod: ZL

## 2025-06-18 PROCEDURE — 87493 C DIFF AMPLIFIED PROBE: CPT | Mod: ZL

## 2025-07-13 ENCOUNTER — HOSPITAL ENCOUNTER (EMERGENCY)
Facility: OTHER | Age: 45
Discharge: HOME | End: 2025-07-13
Attending: FAMILY MEDICINE
Payer: COMMERCIAL

## 2025-07-13 VITALS
RESPIRATION RATE: 14 BRPM | HEART RATE: 77 BPM | TEMPERATURE: 97.6 F | DIASTOLIC BLOOD PRESSURE: 79 MMHG | SYSTOLIC BLOOD PRESSURE: 119 MMHG | OXYGEN SATURATION: 97 %

## 2025-07-13 DIAGNOSIS — K64.4 EXTERNAL HEMORRHOIDS: ICD-10-CM

## 2025-07-13 DIAGNOSIS — K60.2 ANAL FISSURE: ICD-10-CM

## 2025-07-13 PROCEDURE — 99283 EMERGENCY DEPT VISIT LOW MDM: CPT | Performed by: FAMILY MEDICINE

## 2025-07-13 PROCEDURE — 250N000013 HC RX MED GY IP 250 OP 250 PS 637: Performed by: FAMILY MEDICINE

## 2025-07-13 RX ORDER — OXYCODONE AND ACETAMINOPHEN 5; 325 MG/1; MG/1
1 TABLET ORAL ONCE
Refills: 0 | Status: COMPLETED | OUTPATIENT
Start: 2025-07-13 | End: 2025-07-13

## 2025-07-13 RX ORDER — NITROGLYCERIN 4 MG/G
1 OINTMENT RECTAL EVERY 12 HOURS
Qty: 30 G | Refills: 0 | Status: SHIPPED | OUTPATIENT
Start: 2025-07-13

## 2025-07-13 RX ADMIN — OXYCODONE HYDROCHLORIDE AND ACETAMINOPHEN 1 TABLET: 5; 325 TABLET ORAL at 23:11

## 2025-07-13 ASSESSMENT — ENCOUNTER SYMPTOMS
FEVER: 0
CHILLS: 0

## 2025-07-13 ASSESSMENT — ACTIVITIES OF DAILY LIVING (ADL): ADLS_ACUITY_SCORE: 41

## 2025-07-14 NOTE — ED PROVIDER NOTES
"  History     Chief Complaint   Patient presents with    Hemorrhoids     HPI  Omar Hay is a 44 year old male who presents to the ED with rectal pain as below.  No bleeding between wiping.  History of external hemorrhoids in the past    Reviewed nurses notes below  Pt here for a external Hemorroid that burst. Pt had the Hemorid for the past week and it \"burst today\". Pt states when he wiped there was blood in the paper   Allergies:  Allergies   Allergen Reactions    Penicillins Hives and Rash    Ketorolac Hives    Tramadol Hives and Nausea and Vomiting     rash       Problem List:    Patient Active Problem List    Diagnosis Date Noted    Dog bite of right upper extremity 04/07/2025     Priority: Medium    Right shoulder pain, unspecified chronicity 01/16/2025     Priority: Medium    Sacroiliitis 04/30/2024     Priority: Medium    Somatic dysfunction of pelvis region 04/30/2024     Priority: Medium    Methamphetamine abuse in remission (H) 08/17/2023     Priority: Medium    Other cerebral palsy (H) 08/17/2023     Priority: Medium    Finger amputation, no complication 08/17/2023     Priority: Medium    PTSD (post-traumatic stress disorder) 08/17/2023     Priority: Medium    Medical cannabis use 08/17/2023     Priority: Medium    Anisocoria 10/19/2017     Priority: Medium     Formatting of this note might be different from the original.   Left pupil > right pupil          Past Medical History:    No past medical history on file.    Past Surgical History:    No past surgical history on file.    Family History:    No family history on file.    Social History:  Marital Status:  Single [1]  Social History     Tobacco Use    Smoking status: Former     Types: Cigarettes     Passive exposure: Current    Smokeless tobacco: Former    Tobacco comments:     Patient states he can quit on his own    Vaping Use    Vaping status: Never Used   Substance Use Topics    Alcohol use: Not Currently     Comment: quite drinking 13 " years ago    Drug use: Yes     Types: Marijuana     Comment: former meth user 3 years clean/ medical cannabis        Medications:    nitroGLYcerin (RECTIV) 0.4 % OINT rectal ointment  doxycycline hyclate (VIBRAMYCIN) 100 MG capsule  HYDROcodone-acetaminophen (NORCO) 5-325 MG tablet  Lidocaine-Hydrocort, Perianal, 3-0.5 % CREA  medical cannabis (Patient's own supply)          Review of Systems   Constitutional:  Negative for chills and fever.       Physical Exam   BP: 119/79  Pulse: 77  Temp: 97.6  F (36.4  C)  Resp: 14  SpO2: 97 %      Physical Exam  Vitals and nursing note reviewed.   Cardiovascular:      Rate and Rhythm: Normal rate.       Abdomen soft nontender, rectal exam no active bleeding.  Patient does have a external thrombosed hemorrhoid that looks like it has spontaneously ruptured and leaking, patient also seems to have an adjacent area of a small fissure, not actively bleeding           No results found for this or any previous visit (from the past 24 hours).    Medications   oxyCODONE-acetaminophen (PERCOCET) 5-325 MG per tablet 1 tablet (has no administration in time range)       Assessments & Plan (with Medical Decision Making)     I have reviewed the nursing notes.    I have reviewed the findings, diagnosis, plan and need for follow up with the patient.  No I&D indicated, conservative management, surgery referral should he decide he wants these more definitively treated had he has been in multiple times now.  Return with active persistent bleeding.    New Prescriptions    NITROGLYCERIN (RECTIV) 0.4 % OINT RECTAL OINTMENT    Place 1 inch (1.5 mg) rectally every 12 hours.       Final diagnoses:   External hemorrhoids   Anal fissure       7/13/2025   [facility]       Andrew Moore MD  07/13/25 5128

## 2025-07-14 NOTE — ED TRIAGE NOTES
"Pt here for a external Hemorroid that burst.  Pt had the Hemorid for the past week and it \"burst today\".  Pt states when he wiped there was blood in the paper.            "

## 2025-07-15 NOTE — PROGRESS NOTES
Assessment & Plan     External hemorrhoids  Anal fissure  Recent ED visit were diagnosed with external.  Prescription for nitroglycerin given the patient did not fill until yesterday.  Offered repeat exam, deferred today.  Recommend using the nitroglycerin as previously prescribed.  Note given to excuse absence from work while he is recovering due to significant pain in his job being quite physical.  If symptoms persist or worsen follow-up for additional evaluation.    Elevated blood sugar  Has noticed blood sugar has been elevated on last several draws.  Unsure if he was fasting or not.  Recommend A1c for follow-up.  If abnormal consider nutrition referral per patient request.  - Hemoglobin A1c; Future      Subjective   Roque is a 44 year old, presenting for the following health issues:  Abdominal Pain, Hemorrhoids, and Diarrhea    History of Present Illness       Reason for visit:  Hemorrhoids, diarrhea, abdominal pain    He eats 0-1 servings of fruits and vegetables daily.He consumes 4 sweetened beverage(s) daily.He exercises with enough effort to increase his heart rate 60 or more minutes per day.  He exercises with enough effort to increase his heart rate 4 days per week.   He is taking medications regularly.      Here for follow-up on hemorrhoids.  Seen in the ED on 7/13 as well as previous on 6/17 in [facility] for hemorrhoids.  In the ED he was diagnosed with external hemorrhoid and anal fissure.  Prescription for nitroglycerin given.  Patient did not pick that up until yesterday so has only tried once.  He is still having some pain with pressure and bearing down as well as very small amount of bright red blood when wiping after having a bowel movement.  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  He would like a note to excuse his absence while he is recovering.  Patient also has noted during his recent visits he has had labs  checked and his blood sugar is often elevated.  Blood sugar 130 in May 2024, 128 in December 2024, 114 and 113 in the last few months.  Patient is wondering about possible nutrition referral regarding this.     Also requesting to establish care today.    PAST MEDICAL HISTORY: No past medical history on file.    PAST SURGICAL HISTORY: No past surgical history on file.    FAMILY HISTORY: No family history on file.    SOCIAL HISTORY:   Social History     Tobacco Use    Smoking status: Former     Types: Cigarettes     Passive exposure: Current    Smokeless tobacco: Former    Tobacco comments:     Patient states he can quit on his own    Substance Use Topics    Alcohol use: Not Currently     Comment: quite drinking 13 years ago        Allergies   Allergen Reactions    Penicillins Hives and Rash    Ketorolac Hives    Tramadol Hives and Nausea and Vomiting     rash     Current Outpatient Medications   Medication Sig Dispense Refill    medical cannabis (Patient's own supply) See Admin Instructions (The purpose of this order is to document that the patient reports taking medical cannabis.  This is not a prescription, and is not used to certify that the patient has a qualifying medical condition.)      nitroGLYcerin (RECTIV) 0.4 % OINT rectal ointment Place 1 inch (1.5 mg) rectally every 12 hours. 30 g 0    doxycycline hyclate (VIBRAMYCIN) 100 MG capsule Take 1 capsule (100 mg) by mouth 2 times daily. (Patient not taking: Reported on 7/17/2025) 20 capsule 0    HYDROcodone-acetaminophen (NORCO) 5-325 MG tablet Take 1 tablet by mouth every 6 hours as needed for severe pain. (Patient not taking: Reported on 7/17/2025) 12 tablet 0    Lidocaine-Hydrocort, Perianal, 3-0.5 % CREA Externally apply 1 Application topically as needed. (Patient not taking: Reported on 7/17/2025) 28.3 g 0     No current facility-administered medications for this visit.           Objective    /86   Pulse 60   Temp 98.4  F (36.9  C) (Tympanic)    Resp 18   Wt 68.6 kg (151 lb 3.2 oz)   SpO2 97%   BMI 23.68 kg/m    Body mass index is 23.68 kg/m .  Physical Exam   General: Pleasant, in no apparent distress.  Rectal: Patient deferred  Skin: No jaundice, pallor, rashes, or lesions.  Psych: Appropriate mood and affect.      Signed Electronically by: Janelle López PA-C

## 2025-07-17 ENCOUNTER — OFFICE VISIT (OUTPATIENT)
Dept: FAMILY MEDICINE | Facility: OTHER | Age: 45
End: 2025-07-17
Attending: PHYSICIAN ASSISTANT
Payer: COMMERCIAL

## 2025-07-17 VITALS
WEIGHT: 151.2 LBS | BODY MASS INDEX: 23.68 KG/M2 | HEART RATE: 60 BPM | SYSTOLIC BLOOD PRESSURE: 138 MMHG | OXYGEN SATURATION: 97 % | TEMPERATURE: 98.4 F | RESPIRATION RATE: 18 BRPM | DIASTOLIC BLOOD PRESSURE: 86 MMHG

## 2025-07-17 DIAGNOSIS — R73.9 ELEVATED BLOOD SUGAR: ICD-10-CM

## 2025-07-17 DIAGNOSIS — K60.2 ANAL FISSURE: ICD-10-CM

## 2025-07-17 DIAGNOSIS — K64.4 EXTERNAL HEMORRHOIDS: Primary | ICD-10-CM

## 2025-07-17 LAB
EST. AVERAGE GLUCOSE BLD GHB EST-MCNC: 111 MG/DL
HBA1C MFR BLD: 5.5 %

## 2025-07-17 PROCEDURE — 36415 COLL VENOUS BLD VENIPUNCTURE: CPT | Mod: ZL | Performed by: PHYSICIAN ASSISTANT

## 2025-07-17 PROCEDURE — 83036 HEMOGLOBIN GLYCOSYLATED A1C: CPT | Mod: ZL | Performed by: PHYSICIAN ASSISTANT

## 2025-07-17 ASSESSMENT — PAIN SCALES - GENERAL: PAINLEVEL_OUTOF10: MILD PAIN (3)

## 2025-07-17 NOTE — LETTER
2025    Omar Hay   1980        To Whom it May Concern;    Please excuse Omar Hay from work/school for a healthcare visit on 2025. Please excuse his absence from work while he is recovering with an expected return to work date of 2025.         Sincerely,        Janelle López PA-C

## 2025-07-17 NOTE — NURSING NOTE
"Chief Complaint   Patient presents with    Abdominal Pain    Hemorrhoids    Diarrhea       Initial /86   Pulse 60   Temp 98.4  F (36.9  C) (Tympanic)   Resp 18   Wt 68.6 kg (151 lb 3.2 oz)   SpO2 97%   BMI 23.68 kg/m   Estimated body mass index is 23.68 kg/m  as calculated from the following:    Height as of 6/17/25: 1.702 m (5' 7\").    Weight as of this encounter: 68.6 kg (151 lb 3.2 oz).  Medication Review: complete    The next two questions are to help us understand your food security.  If you are feeling you need any assistance in this area, we have resources available to support you today.          5/15/2024   SDOH- Food Insecurity   Within the past 12 months, did you worry that your food would run out before you got money to buy more? N   Within the past 12 months, did the food you bought just not last and you didn t have money to get more? N        Data saved with a previous flowsheet row definition         Health Care Directive:  Patient does not have a Health Care Directive: Discussed advance care planning with patient; however, patient declined at this time.    Janae Rosas LPN      "

## (undated) RX ORDER — DEXAMETHASONE SODIUM PHOSPHATE 10 MG/ML
INJECTION, SOLUTION INTRAMUSCULAR; INTRAVENOUS
Status: DISPENSED
Start: 2024-05-06

## (undated) RX ORDER — CYCLOBENZAPRINE HCL 10 MG
TABLET ORAL
Status: DISPENSED
Start: 2024-01-27

## (undated) RX ORDER — OXYCODONE AND ACETAMINOPHEN 5; 325 MG/1; MG/1
TABLET ORAL
Status: DISPENSED
Start: 2025-07-13

## (undated) RX ORDER — IBUPROFEN 200 MG
TABLET ORAL
Status: DISPENSED
Start: 2024-01-05

## (undated) RX ORDER — ACETAMINOPHEN 325 MG/1
TABLET ORAL
Status: DISPENSED
Start: 2024-01-27

## (undated) RX ORDER — GINSENG 100 MG
CAPSULE ORAL
Status: DISPENSED
Start: 2025-03-29

## (undated) RX ORDER — AZITHROMYCIN 250 MG/1
TABLET, FILM COATED ORAL
Status: DISPENSED
Start: 2024-05-06

## (undated) RX ORDER — OXYCODONE AND ACETAMINOPHEN 5; 325 MG/1; MG/1
TABLET ORAL
Status: DISPENSED
Start: 2024-01-05